# Patient Record
Sex: FEMALE | Race: WHITE | Employment: UNEMPLOYED | URBAN - METROPOLITAN AREA
[De-identification: names, ages, dates, MRNs, and addresses within clinical notes are randomized per-mention and may not be internally consistent; named-entity substitution may affect disease eponyms.]

---

## 2017-07-12 ENCOUNTER — TELEPHONE (OUTPATIENT)
Dept: INTERNAL MEDICINE CLINIC | Age: 57
End: 2017-07-12

## 2017-08-16 ENCOUNTER — TELEPHONE (OUTPATIENT)
Dept: INTERNAL MEDICINE CLINIC | Age: 57
End: 2017-08-16

## 2017-08-16 DIAGNOSIS — Z12.11 SCREEN FOR COLON CANCER: Primary | ICD-10-CM

## 2017-10-31 ENCOUNTER — OFFICE VISIT (OUTPATIENT)
Dept: INTERNAL MEDICINE CLINIC | Age: 57
End: 2017-10-31

## 2017-10-31 VITALS
BODY MASS INDEX: 23.11 KG/M2 | HEART RATE: 69 BPM | SYSTOLIC BLOOD PRESSURE: 110 MMHG | DIASTOLIC BLOOD PRESSURE: 72 MMHG | WEIGHT: 143.8 LBS | HEIGHT: 66 IN

## 2017-10-31 DIAGNOSIS — R20.2 PARESTHESIA OF RIGHT ARM: ICD-10-CM

## 2017-10-31 DIAGNOSIS — M54.2 CERVICALGIA: Primary | ICD-10-CM

## 2017-10-31 PROCEDURE — G8420 CALC BMI NORM PARAMETERS: HCPCS | Performed by: FAMILY MEDICINE

## 2017-10-31 PROCEDURE — 1036F TOBACCO NON-USER: CPT | Performed by: FAMILY MEDICINE

## 2017-10-31 PROCEDURE — 3017F COLORECTAL CA SCREEN DOC REV: CPT | Performed by: FAMILY MEDICINE

## 2017-10-31 PROCEDURE — G8427 DOCREV CUR MEDS BY ELIG CLIN: HCPCS | Performed by: FAMILY MEDICINE

## 2017-10-31 PROCEDURE — 3014F SCREEN MAMMO DOC REV: CPT | Performed by: FAMILY MEDICINE

## 2017-10-31 PROCEDURE — G8484 FLU IMMUNIZE NO ADMIN: HCPCS | Performed by: FAMILY MEDICINE

## 2017-10-31 PROCEDURE — 99213 OFFICE O/P EST LOW 20 MIN: CPT | Performed by: FAMILY MEDICINE

## 2017-10-31 RX ORDER — PREDNISONE 10 MG/1
TABLET ORAL
Qty: 39 TABLET | Refills: 0 | Status: SHIPPED | OUTPATIENT
Start: 2017-10-31 | End: 2017-11-10 | Stop reason: ALTCHOICE

## 2017-10-31 RX ORDER — CYCLOBENZAPRINE HCL 10 MG
10 TABLET ORAL 3 TIMES DAILY PRN
Qty: 30 TABLET | Refills: 0 | Status: SHIPPED | OUTPATIENT
Start: 2017-10-31 | End: 2017-11-10 | Stop reason: ALTCHOICE

## 2017-10-31 NOTE — PROGRESS NOTES
Subjective:      Patient ID: Freddy Mojica is a 62 y.o. female. HPI  Neck Pain  C/O \"nagging nasty pain\" at base of neck on right - got very severe several days ago, but has building up for the past year with driving 4-5 hours at a time, sometimes a few times a day. Radiates down arm  Has been driving a lot and working in the yard - \"tweaked it somehow\"    Using heating pad and pillow with transient relief. \"I need to be able to work and drive the car. \" Consultant, . 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 +N/T in right hand/fingers  \"Feels like I hit my funny bone. \"    No known injury      Review of Systems  Review of Systems - General ROS: negative  Psychological ROS: negative  ENT ROS: negative  Respiratory ROS: no cough, shortness of breath, or wheezing  Cardiovascular ROS: no chest pain or dyspnea on exertion  Gastrointestinal ROS: no abdominal pain, change in bowel habits, or black or bloody stools  Genito-Urinary ROS: no dysuria, trouble voiding, or hematuria  Musculoskeletal ROS: positive for - pain in right arm and right neck  negative for - gait disturbance or muscular weakness  Neurological ROS: positive for - numbness/tingling  negative for - dizziness, headaches, tremors or weakness  Dermatological ROS: negative      Objective:   Physical Exam  Nursing note and vitals reviewed. Vitals:    10/31/17 1407   BP: 110/72   Pulse: 69   Weight: 143 lb 12.8 oz (65.2 kg)   Height: 5' 6\" (1.676 m)     Wt Readings from Last 3 Encounters:   10/31/17 143 lb 12.8 oz (65.2 kg)   08/01/16 147 lb (66.7 kg)   07/23/16 140 lb (63.5 kg)     BP Readings from Last 3 Encounters:   10/31/17 110/72   08/01/16 100/62   07/23/16 119/70     Body mass index is 23.21 kg/m². Constitutional: Patient appears well-developed and well-nourished. No distress. Head: Normocephalic and atraumatic. Oropharyngeal exam: mucous membranes moist, pharynx normal without lesions. Neck: Normal range of motion. Neck supple.  No thyroidmegaly. Cardiovascular: Normal rate, regular rhythm, normal heart sounds and intact distal pulses. Pulmonary/Chest: Effort normal and breath sounds normal. No stridor. No respiratory distress. No wheezes and no rales. Abdominal: Soft. Bowel sounds are normal. No distension and no mass. No tenderness. No rebound and no guarding. Musculoskeletal: No edema and no tenderness. Skin: No rash or erythema. Psychiatric: Normal mood and affect. Behavior is normal.     Assessment:      1. Cervicalgia  predniSONE (DELTASONE) 10 MG tablet    cyclobenzaprine (FLEXERIL) 10 MG tablet    [facility]    Trish Sidhu MD (Spine- Non Surgical)   2. Paresthesia of right arm  predniSONE (DELTASONE) 10 MG tablet    cyclobenzaprine (FLEXERIL) 10 MG tablet    [facility]    Trish Sidhu MD (Spine- Non Surgical)           Plan:      Jen Johnson was seen today for neck pain. Diagnoses and all orders for this visit:    Cervicalgia / Paresthesia of right arm  -     predniSONE (DELTASONE) 10 MG tablet; Take 6 pills daily x2 days, then 4 pills daily x3 days, then 3 pills daily x3 days, then 2 pills daily x2 days, then 1 pill daily x2 days. -     cyclobenzaprine (FLEXERIL) 10 MG tablet; Take 1 tablet by mouth 3 times daily as needed for Muscle spasms  -     [facility]  -     Trish Sidhu MD (Spine- Non Surgical)  Prednisone taper and muscle relaxer prn as above. Pt to schedule with PT and with Dr. Sanjuanita Foote. Return if symptoms worsen or fail to improve. Needs to schedule appt to establish care with a new provider here soon (former Dr. Ce Ackerman pt).

## 2017-10-31 NOTE — PATIENT INSTRUCTIONS
your head toward your shoulder. For example, keeping your right shoulder down, lean your head to the left. 4. Repeat 2 to 4 times toward each shoulder. Diagonal neck stretch    1. Turn your head slightly toward the direction you will be stretching, and tilt your head diagonally toward your chest and hold for 15 to 30 seconds. 2. If you would like a little added stretch, use your hand to gently and steadily pull your head forward on the diagonal.  3. Repeat 2 to 4 times toward each side. Dorsal glide stretch    1. Sit or stand tall and look straight ahead. 2. Slowly tuck your chin as you glide your head backward over your body  3. Hold for a count of 6, and then relax for up to 10 seconds. 4. Repeat 8 to 12 times. Note: The dorsal glide stretches the back of the neck. If you feel pain, do not glide so far back. Some people find this exercise easier to do while lying on their backs with an ice pack on the neck. Chest and shoulder stretch    1. Sit or stand tall and glide your head backward as in the dorsal glide stretch. 2. Raise both arms so that your hands are next to your ears. 3. Take a deep breath, and as you breathe out, lower your elbows down and behind your back. You will feel your shoulder blades slide down and together, and at the same time you will feel a stretch across your chest and the front of your shoulders. 4. Hold for about 6 seconds, and then relax for up to 10 seconds. 5. Repeat 8 to 12 times. Strengthening: Hands on head    1. Move your head backward, forward, and side to side against gentle pressure from your hands, holding each position for about 6 seconds. 2. Repeat 8 to 12 times. Follow-up care is a key part of your treatment and safety. Be sure to make and go to all appointments, and call your doctor if you are having problems. It's also a good idea to know your test results and keep a list of the medicines you take. Where can you learn more?   Go to

## 2017-11-03 ENCOUNTER — TELEPHONE (OUTPATIENT)
Dept: INTERNAL MEDICINE CLINIC | Age: 57
End: 2017-11-03

## 2017-11-03 NOTE — TELEPHONE ENCOUNTER
Patient in on 10/31 and saw Dr. Rebeca Rodriguez for Cervicalgia . Dr. Rebeca Rodriguez, during ov, advised Barri that insurance company's want physical therapy done prior to an MRI. Juliaaustin Cortés wants to see Dr. Kassandra Ramirez and make her visit with him beneficial; therefore, she wants to go ahead and have an MRI ordered. Julia Cortés indicated that she will end up paying for the MRI through their HSA because of the high deductible, and understands that the insurance will question the MRI. Please call Julia Cortés to let her know how soon she can have this MRI @ [phone].

## 2017-11-10 ENCOUNTER — OFFICE VISIT (OUTPATIENT)
Dept: ORTHOPEDIC SURGERY | Age: 57
End: 2017-11-10

## 2017-11-10 VITALS
HEIGHT: 66 IN | DIASTOLIC BLOOD PRESSURE: 73 MMHG | WEIGHT: 143.74 LBS | HEART RATE: 63 BPM | SYSTOLIC BLOOD PRESSURE: 110 MMHG | BODY MASS INDEX: 23.1 KG/M2

## 2017-11-10 DIAGNOSIS — M47.22 OSTEOARTHRITIS OF SPINE WITH RADICULOPATHY, CERVICAL REGION: ICD-10-CM

## 2017-11-10 DIAGNOSIS — M50.30 DDD (DEGENERATIVE DISC DISEASE), CERVICAL: ICD-10-CM

## 2017-11-10 DIAGNOSIS — M54.12 CERVICAL RADICULOPATHY: Primary | ICD-10-CM

## 2017-11-10 PROCEDURE — 99244 OFF/OP CNSLTJ NEW/EST MOD 40: CPT | Performed by: PHYSICAL MEDICINE & REHABILITATION

## 2017-11-10 PROCEDURE — 3017F COLORECTAL CA SCREEN DOC REV: CPT | Performed by: PHYSICAL MEDICINE & REHABILITATION

## 2017-11-10 PROCEDURE — 3014F SCREEN MAMMO DOC REV: CPT | Performed by: PHYSICAL MEDICINE & REHABILITATION

## 2017-11-10 PROCEDURE — G8427 DOCREV CUR MEDS BY ELIG CLIN: HCPCS | Performed by: PHYSICAL MEDICINE & REHABILITATION

## 2017-11-10 PROCEDURE — G8484 FLU IMMUNIZE NO ADMIN: HCPCS | Performed by: PHYSICAL MEDICINE & REHABILITATION

## 2017-11-10 PROCEDURE — G8420 CALC BMI NORM PARAMETERS: HCPCS | Performed by: PHYSICAL MEDICINE & REHABILITATION

## 2017-11-10 RX ORDER — GABAPENTIN 300 MG/1
CAPSULE ORAL
Qty: 30 CAPSULE | Refills: 0 | Status: SHIPPED | OUTPATIENT
Start: 2017-11-10 | End: 2021-12-09 | Stop reason: ALTCHOICE

## 2017-11-10 RX ORDER — PREDNISONE 10 MG/1
TABLET ORAL
Qty: 26 TABLET | Refills: 0 | Status: SHIPPED | OUTPATIENT
Start: 2017-11-10 | End: 2017-11-29 | Stop reason: ALTCHOICE

## 2017-11-10 NOTE — PROGRESS NOTES
reports that she drinks alcohol. She reports that she does not use drugs. Family History:   Family History   Problem Relation Age of Onset    Cancer Mother        REVIEW OF SYSTEMS: Full ROS noted & scanned   CONSTITUTIONAL: Denies unexplained weight loss, fevers, chills or fatigue  NEUROLOGICAL: Denies unsteady gait or progressive weakness  MUSCULOSKELETAL: Denies joint swelling or redness  PSYCHOLOGICAL: Denies anxiety, depression   SKIN: Denies skin changes, delayed healing, rash, itching   HEMATOLOGIC: Denies easy bleeding or bruising  ENDOCRINE: Denies excessive thirst, urination, heat/cold  RESPIRATORY: Denies current dyspnea, cough  GI: Denies nausea, vomiting, diarrhea   : Denies bowel or bladder issues       PHYSICAL EXAM:    Vitals: Blood pressure 110/73, pulse 63, height 5' 5.98\" (1.676 m), weight 143 lb 11.8 oz (65.2 kg), not currently breastfeeding. GENERAL EXAM:  · General Apparence: Patient is adequately groomed with no evidence of malnutrition. · Orientation: The patient is oriented to time, place and person. · Mood & Affect:The patient's mood and affect are appropriate   · Vascular: Examination reveals no swelling tenderness in upper or lower extremities. Good capillary refill  · Lymphatic: The lymphatic examination bilaterally reveals all areas to be without enlargement or induration  · Sensation: Sensation is intact without deficit  · Coordination/Balance: Good coordination     CERVICAL EXAMINATION:  · Inspection: Local inspection shows no step-off or bruising. Cervical alignment is normal.     · Palpation: No evidence of tenderness at the midline, and trapezius. Paraspinal tenderness is present. There is no step-off or paraspinal spasm. · Range of Motion: Extension aggravates pain  · Strength: 5/5 bilateral upper extremities   · Special Tests:    ·   Spurling's, L'Hermitte's & Gibbs's negative bilaterally. ·   Kunz and Impingement tests are negative bilaterally.    ·  Cubital extremity does not show any tenderness, deformity or injury. Range of motion is full. There is no gross instability. There are no rashes, ulcerations or lesions. Strength and tone are normal.    Diagnostic Testing:      November 10, 2017 4 view cervical spine AP lateral and obliques show DDD C4 through C7, straightening of lordosis and C4 through C6 right foraminal narrowing    Impression:    2 weeks right clinical C7 radiculitis with hand and triceps weakness        Plan:     MRI cervical spine  Pred taper  Patient will pursue acupuncture with Dr. Td Chinchilla M.D.   Physical therapy  Follow up to 3 weeks after MRI   Gabapentin 300 mg q.h.s.   Dhruv Castillo

## 2017-11-13 ENCOUNTER — HOSPITAL ENCOUNTER (OUTPATIENT)
Dept: MRI IMAGING | Age: 57
Discharge: HOME | End: 2017-11-13
Attending: PHYSICAL MEDICINE & REHABILITATION | Admitting: PHYSICAL MEDICINE & REHABILITATION

## 2017-11-13 DIAGNOSIS — M47.22 OSTEOARTHRITIS OF SPINE WITH RADICULOPATHY, CERVICAL REGION: ICD-10-CM

## 2017-11-13 DIAGNOSIS — R07.2 PRECORDIAL PAIN: ICD-10-CM

## 2017-11-13 DIAGNOSIS — M54.12 CERVICAL RADICULOPATHY: ICD-10-CM

## 2017-11-13 DIAGNOSIS — M50.30 DDD (DEGENERATIVE DISC DISEASE), CERVICAL: ICD-10-CM

## 2017-11-29 ENCOUNTER — OFFICE VISIT (OUTPATIENT)
Dept: ORTHOPEDIC SURGERY | Age: 57
End: 2017-11-29

## 2017-11-29 VITALS
HEIGHT: 66 IN | SYSTOLIC BLOOD PRESSURE: 114 MMHG | HEART RATE: 55 BPM | DIASTOLIC BLOOD PRESSURE: 76 MMHG | BODY MASS INDEX: 23.1 KG/M2 | WEIGHT: 143.74 LBS

## 2017-11-29 DIAGNOSIS — M47.22 OSTEOARTHRITIS OF SPINE WITH RADICULOPATHY, CERVICAL REGION: ICD-10-CM

## 2017-11-29 DIAGNOSIS — M54.12 CERVICAL RADICULOPATHY: Primary | ICD-10-CM

## 2017-11-29 DIAGNOSIS — M50.30 DDD (DEGENERATIVE DISC DISEASE), CERVICAL: ICD-10-CM

## 2017-11-29 PROCEDURE — G8427 DOCREV CUR MEDS BY ELIG CLIN: HCPCS | Performed by: PHYSICAL MEDICINE & REHABILITATION

## 2017-11-29 PROCEDURE — 99213 OFFICE O/P EST LOW 20 MIN: CPT | Performed by: PHYSICAL MEDICINE & REHABILITATION

## 2017-11-29 PROCEDURE — 1036F TOBACCO NON-USER: CPT | Performed by: PHYSICAL MEDICINE & REHABILITATION

## 2017-11-29 PROCEDURE — 3017F COLORECTAL CA SCREEN DOC REV: CPT | Performed by: PHYSICAL MEDICINE & REHABILITATION

## 2017-11-29 PROCEDURE — 3014F SCREEN MAMMO DOC REV: CPT | Performed by: PHYSICAL MEDICINE & REHABILITATION

## 2017-11-29 PROCEDURE — G8420 CALC BMI NORM PARAMETERS: HCPCS | Performed by: PHYSICAL MEDICINE & REHABILITATION

## 2017-11-29 PROCEDURE — G8484 FLU IMMUNIZE NO ADMIN: HCPCS | Performed by: PHYSICAL MEDICINE & REHABILITATION

## 2017-12-01 ENCOUNTER — HOSPITAL ENCOUNTER (OUTPATIENT)
Dept: PHYSICAL THERAPY | Age: 57
Discharge: HOME | End: 2017-12-31
Attending: PHYSICAL MEDICINE & REHABILITATION | Admitting: PHYSICAL MEDICINE & REHABILITATION

## 2017-12-04 ENCOUNTER — HOSPITAL ENCOUNTER (OUTPATIENT)
Dept: PHYSICAL THERAPY | Age: 57
Discharge: HOME | End: 2017-11-30
Admitting: PHYSICAL MEDICINE & REHABILITATION

## 2017-12-04 ENCOUNTER — HOSPITAL ENCOUNTER (OUTPATIENT)
Dept: PHYSICAL THERAPY | Age: 57
Discharge: HOME | End: 2017-12-04
Admitting: PHYSICAL MEDICINE & REHABILITATION

## 2017-12-04 NOTE — FLOWSHEET NOTE
[facility],  [address]  Phone: [phone]  Fax [phone]    Physical Therapy Daily Treatment Note  Date:  2017    Patient Name:  Josy Winters    :  1960  MRN: [id number]  Restrictions/Precautions:    Physician Information:  Referring Practitioner: Dr. Enoch Metcalf   Medical/Treatment Diagnosis Information:  · Diagnosis: M54.12 cervical radiculopathy, M50.30 degenerative disc disease cervical, M47.22 Osteoarthritis of spine with radiculopathy   · Treatment Diagnosis:   M54.2 neck pain, M62.9 muscular tightness                                          [x] Conservative / [] Surgical - DOS:  Therapy Diagnosis/Practice Pattern:  Practice Pattern F: Spinal Disorders  Insurance/Certification information:  PT Insurance Information: [address] [place] [address] of care signed: [] YES  [] NO  Number of Comorbidities:  []0     [x]1-2    []3+  Date of Patient follow up with Physician:     G-Code (if applicable):      Date G-Code Applied: 17   PT G-Codes  Functional Assessment Tool Used: Neck Disability Index  Score: 38%  Functional Limitation: Carrying, moving and handling objects  Carrying, Moving and Handling Objects Current Status (): At least 20 percent but less than 40 percent impaired, limited or restricted  Carrying, Moving and Handling Objects Goal Status ():  At least 1 percent but less than 20 percent impaired, limited or restricted    Progress Note: [x]  Yes  []  No  Next due by: Visit #10        Latex Allergy:  [x]NO      []YES  Preferred Language for Healthcare:   [x]English       []other:    Visit # Insurance Allowable Reporting Period    does not require auth Begin Date: 2017               End Date:      RECERT DUE BY: 79    SUBJECTIVE:  See eval    OBJECTIVE: See eval  Observation:  Palpation:     Test used Initial score Current Score   Pain Summary VAS     Functional questionnaire Lore and UE control with self care, carrying, lifting, driving/computer work. Home Exercise Program:    [x] (32663) Reviewed/Progressed HEP activities related to strengthening, flexibility, endurance, ROM of cervical, scapular, scapulothoracic and UE control with self care, reaching, carrying, lifting, house/yardwork, driving/computer work  [] (91374) Reviewed/Progressed HEP activities related to improving balance, coordination, kinesthetic sense, posture, motor skill, proprioception of cervical, scapular, scapulothoracic and UE control with self care, reaching, carrying, lifting, house/yardwork, driving/computer work      Manual Treatments:  PROM / STM / Oscillations-Mobs:  G-I, II, III, IV (PA's, Inf., Post.)  [] (25176) Provided manual therapy to mobilize soft tissue/joints of cervical/CT, scapular GHJ and UE for the purpose of decreasing headache, modulating pain, promoting relaxation,  increasing ROM, reducing/eliminating soft tissue swelling/inflammation/restriction, improving soft tissue extensibility and allowing for proper ROM for normal function with self care, reaching, carrying, lifting, house/yardwork, driving/computer work    Modalities:  PM/MHP x 10 min supine     Charges:  Timed Code Treatment Minutes: 30   Total Treatment Minutes: 65     [x] EVAL (LOW) 29676 (typically 20 minutes face-to-face)  [] EVAL (MOD) 43918 (typically 30 minutes face-to-face)  [] EVAL (HIGH) 11622 (typically 45 minutes face-to-face)  [] RE-EVAL     [] JL(32953) x      [] IONTO  [] NMR (96593) x      [] VASO  [x] Manual (59473) x  2    [] Other:  [] TA x       [] Mech Traction (39999)  [] ES(attended) (64577)      [x] ES (un) (85178):     GOALS:  Patient stated goal: Pt would like to get back to playing tennis and crocheting with decreased pain and limitations. Therapist goals for Patient:   Short Term Goals: To be achieved in: 2 weeks  1.  Independent in HEP and progression per patient tolerance, in order to prevent re-injury. 2. Patient will have a decrease in pain to facilitate improvement in movement, function, and ADLs as indicated by Functional Deficits. Long Term Goals: To be achieved in: 10 weeks  1. Disability index score of 20% or less for the NDI to assist with reaching prior level of function. 2. Patient will demonstrate increased AROM to [facility] of cervical/thoracic spine to allow for proper joint functioning as indicated by patients Functional Deficits. 3. Patient will demonstrate an increase in postural awareness and control and activation of  Deep cervical stabilizers to allow for proper functional mobility as indicated by patients Functional Deficits. 4. Patient will return to sleeping, gripping objects functional activities without increased symptoms or restriction. 5. Patient will return to playing tennis and crocheting with decreased pain and limitations from her neck and R UE. New or Updated Goals (if applicable):  [x] No change to goals established upon initial eval/last progress note:  New Goals:    Progression Towards Functional goals:   [] Patient is progressing as expected towards functional goals listed. [] Progression is slowed due to complexities listed. [] Progression has been slowed due to co-morbidities.   [x] Plan just implemented, too soon to assess goals progression  [] Other:     ASSESSMENT:    [] Improvement noted relative to goals:  [] No Improvement noted related to goals:  Summary/Patient's response to treatment: See Eval    Treatment/Activity Tolerance:  [x] Patient tolerated treatment well [] Patient limited by fatique  [] Patient limited by pain  [] Patient limited by other medical complications  [] Other:     Prognosis: [] Good [] Fair  [] Poor    Patient Requires Follow-up: [x] Yes  [] No    PLAN: See eval  [] Continue per plan of care [] Alter current plan (see comments)  [x] Plan of care initiated [] Hold pending MD visit [] Discharge    Electronically signed by:

## 2017-12-04 NOTE — PLAN OF CARE
[facility], [address] [facility]  [address] [place] [address], [address] [place] [address]  Phone: [phone]  Fax [phone]   Physical Therapy Certification    Dear Referring Practitioner: Dr. Sanjuanita Foote ,    We had the pleasure of evaluating the following patient for physical therapy services at [address] [place] [address]. A summary of our findings can be found in the initial assessment below. This includes our plan of care. If you have any questions or concerns regarding these findings, please do not hesitate to contact me at the office phone number checked above. Thank you for the referral.       Physician Signature:_______________________________Date:__________________  By signing above (or electronic signature), therapists plan is approved by physician      Patient: Lita Huff   : 1960   MRN: [id number]  Referring Physician: Referring Practitioner: Dr. Sanjuanita Foote       Evaluation Date: 2017      Medical Diagnosis Information:  Diagnosis: M54.12 cervical radiculopathy, M50.30 degenerative disc disease cervical, M47.22 Osteoarthritis of spine with radiculopathy    Treatment Diagnosis: M54.2 neck pain, M62.9 muscular tightness                                          Insurance information: PT Insurance Information: [facility]    Precautions/ Contra-indications: C5-C6 disc bulge, C6-C7 framinal stenosis   Latex Allergy:  [x]NO      []YES  Preferred Language for Healthcare:   [x]English       []other:    SUBJECTIVE: Patient stated complaint: neck pain and radiating right arm pain/symptoms. 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 Reports no injury. Pt reports that she went to PCP and she was referred to Dr. Sanjuanita Foote. Pt had MRI taken with findings of C6-C7 disc bulge and C6-C7 foraminal stenosis.  Pt reports that she has numbness pain in R arm and R arm tolerance with driving and/or computer work   [x]Reduced ability to perform lifting, reaching, carrying tasks   [x]Reduced ability to concentrate   [x]Reduced ability to sleep    [x]Reduced ability to tolerate any impact through UE or spine   []Reduced ability to ambulate prolonged functional periods/distances   []other:    Participation Restrictions   [x]Reduced participation in self care activities   [x]Reduced participation in home management activities   [x]Reduced participation in work activities   [x]Reduced participation in social activities. [x]Reduced participation in sport/recreational activities. Classification/Subgrouping:   []signs/symptoms consistent with neck pain with mobility deficits     []signs/symptoms consistent with neck pain with movement coordinated impairments    []signs/symptoms consistent with neck pain with radiating pain    []signs/symptoms consistent with neck pain with headaches (cervicogenic)    [x]Signs/symptoms consistent with nerve root involvement including myotome & dermatome dysfunction   []sign/symptoms consistent with facet dysfunction of cervical and thoracic spine    []signs/symptoms consistent suggesting central cord compression/UMN syndromes   []signs/symptoms consistent with discogenic cervical pain   []signs/symptoms consistent with rib dysfunction   []signs/symptoms consistent with postural dysfunction   []signs/symptoms consistent with shoulder pathology    []signs/symptoms consistent with post-surgical status including decreased ROM, strength and function.    []signs/symptoms consistent with pathology which may benefit from Dry Needling   []signs/symptoms which may limit the use of advanced manual therapy techniques: (Elevated CV risk profile, recent trauma, intolerance to end range positions, prior TIA, visual issues, UE neurological compromise )     Prognosis/Rehab Potential:      []Excellent   [x]Good    []Fair   []Poor    Tolerance of evaluation/treatment: modification, progression of HEP. HEP instruction: Pt given and educated on HEP. (see scanned forms)    GOALS:  Patient stated goal: Pt would like to get back to playing tennis and crocheting with decreased pain and limitations. Therapist goals for Patient:   Short Term Goals: To be achieved in: 2 weeks  1. Independent in HEP and progression per patient tolerance, in order to prevent re-injury. 2. Patient will have a decrease in pain to facilitate improvement in movement, function, and ADLs as indicated by Functional Deficits. Long Term Goals: To be achieved in: 10 weeks  1. Disability index score of 20% or less for the NDI to assist with reaching prior level of function. 2. Patient will demonstrate increased AROM to [facility] of cervical/thoracic spine to allow for proper joint functioning as indicated by patients Functional Deficits. 3. Patient will demonstrate an increase in postural awareness and control and activation of  Deep cervical stabilizers to allow for proper functional mobility as indicated by patients Functional Deficits. 4. Patient will return to sleeping, gripping objects functional activities without increased symptoms or restriction. 5. Patient will return to playing tennis and crocheting with decreased pain and limitations from her neck and R UE.       Electronically signed by:  Barb Snider, PT

## 2017-12-07 ENCOUNTER — HOSPITAL ENCOUNTER (OUTPATIENT)
Dept: PHYSICAL THERAPY | Age: 57
Discharge: HOME | End: 2017-12-07
Admitting: PHYSICAL MEDICINE & REHABILITATION

## 2017-12-07 NOTE — FLOWSHEET NOTE
[facility], [address]  Phone: [phone]  Fax [phone]    Physical Therapy Daily Treatment Note  Date:  2017    Patient Name:  Sanjeev Chew    :  1960  MRN: [id number]  Restrictions/Precautions:    Physician Information:    Referring Practitioner: Dr. Toni Orozco   Medical/Treatment Diagnosis Information:     M54.12 cervical radiculopathy, M50.30 degenerative disc disease cervical  · Treatment Diagnosis:   M54.2 neck pain, M62.9 muscular tightness                                          [x] Conservative / [] Surgical - DOS:  Therapy Diagnosis/Practice Pattern:  Practice Pattern F: Spinal Disorders  Insurance/Certification information:    PT Insurance Information: [facility]  Plan of care signed: [x] YES  [] NO  Number of Comorbidities:  []0     [x]1-2    []3+  Date of Patient follow up with Physician:     G-Code (if applicable):      Date G-Code Applied: 17        Progress Note: []  Yes  [x]  No  Next due by: Visit #10        Latex Allergy:  [x]NO      []YES  Preferred Language for Healthcare:   [x]English       []other:    Visit # Insurance Allowable Reporting Period    does not require auth Begin Date: 2017               End Date:      RECERT DUE BY: 6/3/93    SUBJECTIVE:  Pt reports that she is feeling \"a little better. \" Reports decreased numbness/tingling in R hand.      OBJECTIVE:   Observation:  Palpation: (+) TTP R UT      Test used Initial score Current Score   Pain Summary VAS 10    Functional questionnaire NDI 38%    ROM Cervical Flexion      Cervical Ext      Cervical SB R/L      Cervical rotation R/L     Strength                     RESTRICTIONS/PRECAUTIONS:  C5-C6 disc bulge, C6-C7 framinal stenosis     Exercises/Interventions:   Therapeutic Ex Sets/sec Reps Notes   UBE      T- band Row/pinch      T- band lower pinch      T- band ER activation      UT stretch  4    Levator stretch      Isometric at wall      Quadruped w cerv retract      Front plank      Side plank      Chin tuck 2 10 Seated and Supine one pillow    Chin tuck w lift      Chin tuck w rotation      SBS  3 10    SB SBS 2 10    Manual Intervention      Cervical distraction  5 min   Decreased pain   Trigger point release/STM UT  15 min      Cervical Retraction  2x10      PA mobs cervical grade I-II 5 min   Pain relief                     NMR re-education      T-spine Ext- foam roll      Chin tucks  3 10 With OP/AROM supine    Pt education                   Traction                  Centralization of symptoms post MT. Therapeutic Exercise and NMR EXR  [x] (09781) Provided verbal/tactile cueing for activities related to strengthening, flexibility, endurance, ROM  for improvements in cervical, postural, scapular, scapulothoracic and UE control with self care, reaching, carrying, lifting, house/yardwork, driving/computer work.    [] (08780) Provided verbal/tactile cueing for activities related to improving balance, coordination, kinesthetic sense, posture, motor skill, proprioception  to assist with cervical, scapular, scapulothoracic and UE control with self care, reaching, carrying, lifting, house/yardwork, driving/computer work. Therapeutic Activities:    [] (09471 or 32986) Provided verbal/tactile cueing for activities related to improving balance, coordination, kinesthetic sense, posture, motor skill, proprioception and motor activation to allow for proper function of cervical, scapular, scapulothoracic and UE control with self care, carrying, lifting, driving/computer work.      Home Exercise Program:    [x] (20658) Reviewed/Progressed HEP activities related to strengthening, flexibility, endurance, ROM of cervical, scapular, scapulothoracic and UE control with self care, reaching, carrying, lifting, house/yardwork, driving/computer work  [] (25076) Reviewed/Progressed HEP activities related to improving balance, coordination, kinesthetic sense, posture, motor skill, proprioception of cervical, scapular, scapulothoracic and UE control with self care, reaching, carrying, lifting, house/yardwork, driving/computer work      Manual Treatments:  PROM / STM / Oscillations-Mobs:  G-I, II, III, IV (PA's, Inf., Post.)  [] (75142) Provided manual therapy to mobilize soft tissue/joints of cervical/CT, scapular GHJ and UE for the purpose of decreasing headache, modulating pain, promoting relaxation,  increasing ROM, reducing/eliminating soft tissue swelling/inflammation/restriction, improving soft tissue extensibility and allowing for proper ROM for normal function with self care, reaching, carrying, lifting, house/yardwork, driving/computer work    Modalities:  PM/MHP x 15 min supine     Charges:  Timed Code Treatment Minutes: 45   Total Treatment Minutes: 60     [] EVAL (LOW) 88159 (typically 20 minutes face-to-face)  [] EVAL (MOD) 69070 (typically 30 minutes face-to-face)  [] EVAL (HIGH) 99928 (typically 45 minutes face-to-face)  [] RE-EVAL     [x] IX(55815) x  1   [] IONTO  [] NMR (43757) x      [] VASO  [x] Manual (29031) x  2    [] Other:  [] TA x       [] Mech Traction (02206)  [] ES(attended) (59677)      [x] ES (un) (97754):     GOALS:  Patient stated goal: Pt would like to get back to playing tennis and crocheting with decreased pain and limitations. Therapist goals for Patient:   Short Term Goals: To be achieved in: 2 weeks  1. Independent in HEP and progression per patient tolerance, in order to prevent re-injury. 2. Patient will have a decrease in pain to facilitate improvement in movement, function, and ADLs as indicated by Functional Deficits. Long Term Goals: To be achieved in: 10 weeks  1. Disability index score of 20% or less for the NDI to assist with reaching prior level of function.    2. Patient will demonstrate increased AROM to [facility] of cervical/thoracic spine to allow for proper joint functioning as indicated by patients Functional Deficits. 3. Patient will demonstrate an increase in postural awareness and control and activation of  Deep cervical stabilizers to allow for proper functional mobility as indicated by patients Functional Deficits. 4. Patient will return to sleeping, gripping objects functional activities without increased symptoms or restriction. 5. Patient will return to playing tennis and crocheting with decreased pain and limitations from her neck and R UE. New or Updated Goals (if applicable):  [x] No change to goals established upon initial eval/last progress note:  New Goals:    Progression Towards Functional goals:   [] Patient is progressing as expected towards functional goals listed. [] Progression is slowed due to complexities listed. [] Progression has been slowed due to co-morbidities. [x] Plan just implemented, too soon to assess goals progression  [] Other:     ASSESSMENT:    [] Improvement noted relative to goals:  [] No Improvement noted related to goals:  Summary/Patient's response to treatment: Pt tolerated treatment session well with reported centralization of symptoms post manual therapy. Pt displayed decreased tenderness/tightness in UT post MT and e-stim+MHP. Pt tolerated some light parascapular and postural strengthening. She is not at her PLOF and will continue to require skilled PT to address her deficits and get her back to her PLOF.      Treatment/Activity Tolerance:  [x] Patient tolerated treatment well [] Patient limited by fatique  [] Patient limited by pain  [] Patient limited by other medical complications  [] Other:     Prognosis: [x] Good [] Fair  [] Poor    Patient Requires Follow-up: [x] Yes  [] No    PLAN: See eval  [x] Continue per plan of care [] Alter current plan (see comments)  [] Plan of care initiated [] Hold pending MD visit [] Discharge    Electronically signed by: Blaine Romero, PT

## 2017-12-12 ENCOUNTER — TELEPHONE (OUTPATIENT)
Dept: INTERNAL MEDICINE CLINIC | Age: 57
End: 2017-12-12

## 2017-12-12 ENCOUNTER — HOSPITAL ENCOUNTER (OUTPATIENT)
Dept: PHYSICAL THERAPY | Age: 57
Discharge: HOME | End: 2017-12-12
Admitting: PHYSICAL MEDICINE & REHABILITATION

## 2017-12-12 NOTE — FLOWSHEET NOTE
[facility], [address] [place]  Phone: [phone]  Fax [phone]    Physical Therapy Daily Treatment Note  Date:  2017    Patient Name:  Salome Bowie    :  1960  MRN: [id number]  Restrictions/Precautions:    Physician Information:    Referring Practitioner: Dr. Jovanni Dukes   Medical/Treatment Diagnosis Information:     M54.12 cervical radiculopathy, M50.30 degenerative disc disease cervical  · Treatment Diagnosis:   M54.2 neck pain, M62.9 muscular tightness                                          [x] Conservative / [] Surgical - DOS:  Therapy Diagnosis/Practice Pattern:  Practice Pattern F: Spinal Disorders  Insurance/Certification information:    PT Insurance Information: [facility]  Plan of care signed: [x] YES  [] NO  Number of Comorbidities:  []0     [x]1-2    []3+  Date of Patient follow up with Physician:     G-Code (if applicable):      Date G-Code Applied: 17        Progress Note: []  Yes  [x]  No  Next due by: Visit #10        Latex Allergy:  [x]NO      []YES  Preferred Language for Healthcare:   [x]English       []other:    Visit # Insurance Allowable Reporting Period   3  does not require auth Begin Date: 2017               End Date:      RECERT DUE BY: 3/7/82    SUBJECTIVE:  PT is helping. Less pain overall. Residual numbness digits 4 and 5.     OBJECTIVE:   Observation:  Palpation: (+) TTP R UT      Test used Initial score Current Score   Pain Summary VAS 10    Functional questionnaire NDI 38%    ROM Cervical Flexion      Cervical Ext      Cervical SB R/L      Cervical rotation R/L     Strength                     RESTRICTIONS/PRECAUTIONS:  C5-C6 disc bulge, C6-C7 framinal stenosis     Exercises/Interventions:   Therapeutic Ex Sets/sec Reps Notes   UBE      T- band Row/pinch      T- band lower pinch      T- band ER activation      UT stretch  4    Levator stretch      No money 10 2 GTB Quadruped w cerv retract      Front plank      Side plank      Chin tuck 2 10 Seated and Supine one pillow    Chin tuck w lift      Chin tuck w rotation      SBS  3 10    SB SBS 2 10    Manual Intervention      Cervical distraction  5 min   Decreased pain   Trigger point release/STM UT  15 min      Cervical Retraction  2x10      PA mobs cervical grade I-II 5 min   Pain relief   1st rib mob 2 min                 NMR re-education      T-spine Ext- foam roll      Chin tucks  3 10 With OP/AROM supine    Pt education                   Traction                  Centralization of symptoms post MT. Therapeutic Exercise and NMR EXR  [x] (86160) Provided verbal/tactile cueing for activities related to strengthening, flexibility, endurance, ROM  for improvements in cervical, postural, scapular, scapulothoracic and UE control with self care, reaching, carrying, lifting, house/yardwork, driving/computer work.    [] (62543) Provided verbal/tactile cueing for activities related to improving balance, coordination, kinesthetic sense, posture, motor skill, proprioception  to assist with cervical, scapular, scapulothoracic and UE control with self care, reaching, carrying, lifting, house/yardwork, driving/computer work. Therapeutic Activities:    [] (50791 or 00455) Provided verbal/tactile cueing for activities related to improving balance, coordination, kinesthetic sense, posture, motor skill, proprioception and motor activation to allow for proper function of cervical, scapular, scapulothoracic and UE control with self care, carrying, lifting, driving/computer work.      Home Exercise Program:    [x] (77948) Reviewed/Progressed HEP activities related to strengthening, flexibility, endurance, ROM of cervical, scapular, scapulothoracic and UE control with self care, reaching, carrying, lifting, house/yardwork, driving/computer work  [] (46865) Reviewed/Progressed HEP activities related to improving balance, coordination,

## 2017-12-14 ENCOUNTER — OFFICE VISIT (OUTPATIENT)
Dept: ORTHOPEDIC SURGERY | Age: 57
End: 2017-12-14

## 2017-12-14 VITALS — WEIGHT: 143.74 LBS | HEIGHT: 66 IN | BODY MASS INDEX: 23.1 KG/M2

## 2017-12-14 DIAGNOSIS — M50.00 HNP (HERNIATED NUCLEUS PULPOSUS) WITH MYELOPATHY, CERVICAL: Primary | ICD-10-CM

## 2017-12-15 ENCOUNTER — HOSPITAL ENCOUNTER (OUTPATIENT)
Dept: PHYSICAL THERAPY | Age: 57
Discharge: HOME | End: 2017-12-15
Admitting: PHYSICAL MEDICINE & REHABILITATION

## 2017-12-15 NOTE — FLOWSHEET NOTE
Exercises/Interventions:   Therapeutic Ex Sets/sec Reps Notes   UBE      T- band Row/pinch 1 10 YTB   T- band lower pinch 1 10 YTB   T- band ER activation      UT stretch 1/30 2    Levator stretch 1/30 2    No money 10 2 GTB   Quadruped w cerv retract      Front plank      Side plank      Chin tuck 2 10 Seated and     Chin tuck w lift      Chin tuck w rotation      SBS  3 10    SB SBS 2 10    Manual Intervention      Cervical distraction  5 min   Decreased pain   Trigger point release/STM UT  15 min      Cervical Retraction  2x10      PA mobs cervical grade I-II 5 min   Pain relief   1st rib mob 2 min                 NMR re-education      T-spine Ext- foam roll      Chin tucks  3 10 With OP/AROM supine    Pt education                   Traction                  Centralization of symptoms post MT. Therapeutic Exercise and NMR EXR  [x] (27761) Provided verbal/tactile cueing for activities related to strengthening, flexibility, endurance, ROM  for improvements in cervical, postural, scapular, scapulothoracic and UE control with self care, reaching, carrying, lifting, house/yardwork, driving/computer work.    [] (99884) Provided verbal/tactile cueing for activities related to improving balance, coordination, kinesthetic sense, posture, motor skill, proprioception  to assist with cervical, scapular, scapulothoracic and UE control with self care, reaching, carrying, lifting, house/yardwork, driving/computer work. Therapeutic Activities:    [] (26902 or 72935) Provided verbal/tactile cueing for activities related to improving balance, coordination, kinesthetic sense, posture, motor skill, proprioception and motor activation to allow for proper function of cervical, scapular, scapulothoracic and UE control with self care, carrying, lifting, driving/computer work.      Home Exercise Program:    [x] (16688) Reviewed/Progressed HEP activities related to strengthening, flexibility, endurance, ROM of cervical, of 20% or less for the NDI to assist with reaching prior level of function. 2. Patient will demonstrate increased AROM to [facility] of cervical/thoracic spine to allow for proper joint functioning as indicated by patients Functional Deficits. 3. Patient will demonstrate an increase in postural awareness and control and activation of  Deep cervical stabilizers to allow for proper functional mobility as indicated by patients Functional Deficits. 4. Patient will return to sleeping, gripping objects functional activities without increased symptoms or restriction. 5. Patient will return to playing tennis and crocheting with decreased pain and limitations from her neck and R UE. New or Updated Goals (if applicable):  [x] No change to goals established upon initial eval/last progress note:  New Goals:    Progression Towards Functional goals:   [x] Patient is progressing as expected towards functional goals listed. [] Progression is slowed due to complexities listed. [] Progression has been slowed due to co-morbidities. [] Plan just implemented, too soon to assess goals progression  [] Other:     ASSESSMENT:    [] Improvement noted relative to goals:  [] No Improvement noted related to goals:  Summary/Patient's response to treatment: No complaints of distal extremity pain during session. Tightness persists R UT and scalenes. Pt tolerated manuals well. Continue with current treatment plan.     Treatment/Activity Tolerance:  [x] Patient tolerated treatment well [] Patient limited by fatique  [] Patient limited by pain  [] Patient limited by other medical complications  [] Other:     Prognosis: [x] Good [] Fair  [] Poor    Patient Requires Follow-up: [x] Yes  [] No    PLAN: See eval  [x] Continue per plan of care [] Alter current plan (see comments)  [] Plan of care initiated [] Hold pending MD visit [] Discharge    Electronically signed by: Jagjit Manzo, PT

## 2017-12-18 ENCOUNTER — HOSPITAL ENCOUNTER (OUTPATIENT)
Dept: PHYSICAL THERAPY | Age: 57
Discharge: HOME | End: 2017-12-18
Admitting: PHYSICAL MEDICINE & REHABILITATION

## 2017-12-18 NOTE — FLOWSHEET NOTE
stretch 1/30 2    Levator stretch 1/30 2    Pec stretch at door  1/30  3    No money 10 2 GTB   Quadruped w cerv retract      Front plank      Side plank      Chin tuck 2 10 Seated and     Chin tuck w lift 2 10    Chin tuck w rotation      SBS  3 10    SB SBS 2 10    Prone scapular retraction  2 10    Ulnar nerve glide 3 10           Manual Intervention      Cervical distraction  5 min   Decreased pain   Trigger point release/STM UT  10 min      Cervical Retraction  2x10      PA mobs cervical grade I-II 5 min   Pain relief   1st rib mob 2 min                 NMR re-education      T-spine Ext- foam roll      Chin tucks  3 10 With OP/AROM supine    Pt education       Edge of plnth plank+chin tuck  1 4 20\"          Traction                  Centralization of symptoms post MT. Therapeutic Exercise and NMR EXR  [x] (09101) Provided verbal/tactile cueing for activities related to strengthening, flexibility, endurance, ROM  for improvements in cervical, postural, scapular, scapulothoracic and UE control with self care, reaching, carrying, lifting, house/yardwork, driving/computer work.    [] (90295) Provided verbal/tactile cueing for activities related to improving balance, coordination, kinesthetic sense, posture, motor skill, proprioception  to assist with cervical, scapular, scapulothoracic and UE control with self care, reaching, carrying, lifting, house/yardwork, driving/computer work. Therapeutic Activities:    [] (06351 or 89415) Provided verbal/tactile cueing for activities related to improving balance, coordination, kinesthetic sense, posture, motor skill, proprioception and motor activation to allow for proper function of cervical, scapular, scapulothoracic and UE control with self care, carrying, lifting, driving/computer work.      Home Exercise Program:    [x] (31533) Reviewed/Progressed HEP activities related to strengthening, flexibility, endurance, ROM of cervical, scapular, scapulothoracic and UE control with self care, reaching, carrying, lifting, house/yardwork, driving/computer work  [] (97455) Reviewed/Progressed HEP activities related to improving balance, coordination, kinesthetic sense, posture, motor skill, proprioception of cervical, scapular, scapulothoracic and UE control with self care, reaching, carrying, lifting, house/yardwork, driving/computer work      Manual Treatments:  PROM / STM / Oscillations-Mobs:  G-I, II, III, IV (PA's, Inf., Post.)  [x] (38520) Provided manual therapy to mobilize soft tissue/joints of cervical/CT, scapular GHJ and UE for the purpose of decreasing headache, modulating pain, promoting relaxation,  increasing ROM, reducing/eliminating soft tissue swelling/inflammation/restriction, improving soft tissue extensibility and allowing for proper ROM for normal function with self care, reaching, carrying, lifting, house/yardwork, driving/computer work    Modalities:  PM/MHP x 15 min seated     Charges:  Timed Code Treatment Minutes: 45   Total Treatment Minutes: 60     [] EVAL (LOW) 40313 (typically 20 minutes face-to-face)  [] EVAL (MOD) 35866 (typically 30 minutes face-to-face)  [] EVAL (HIGH) 30722 (typically 45 minutes face-to-face)  [] RE-EVAL     [x] MP(98760) x  1   [] IONTO  [] NMR (90029) x      [] VASO  [x] Manual (46114) x  2    [] Other:  [] TA x       [] Mech Traction (86439)  [] ES(attended) (34642)      [x] ES (un) (73158):     GOALS:  Patient stated goal: Pt would like to get back to playing tennis and crocheting with decreased pain and limitations. Therapist goals for Patient:   Short Term Goals: To be achieved in: 2 weeks  1. Independent in HEP and progression per patient tolerance, in order to prevent re-injury. 2. Patient will have a decrease in pain to facilitate improvement in movement, function, and ADLs as indicated by Functional Deficits. Long Term Goals: To be achieved in: 10 weeks  1.  Disability index score of 20% or less for the NDI to

## 2017-12-21 ENCOUNTER — HOSPITAL ENCOUNTER (OUTPATIENT)
Dept: PHYSICAL THERAPY | Age: 57
Discharge: HOME | End: 2017-12-21
Admitting: PHYSICAL MEDICINE & REHABILITATION

## 2017-12-21 NOTE — FLOWSHEET NOTE
[facility],  [address]  Phone: [phone]  Fax [phone]    Physical Therapy Daily Treatment Note  Date:  2017    Patient Name:  Lita Huff    :  1960  MRN: [id number]  Restrictions/Precautions:    Physician Information:    Referring Practitioner: Dr. Sanjuanita Foote   Medical/Treatment Diagnosis Information:     M54.12 cervical radiculopathy, M50.30 degenerative disc disease cervical  · Treatment Diagnosis:   M54.2 neck pain, M62.9 muscular tightness                                          [x] Conservative / [] Surgical - DOS:  Therapy Diagnosis/Practice Pattern:  Practice Pattern F: Spinal Disorders  Insurance/Certification information:    PT Insurance Information: [facility]  Plan of care signed: [x] YES  [] NO  Number of Comorbidities:  []0     [x]1-2    []3+  Date of Patient follow up with Physician:     G-Code (if applicable):      Date G-Code Applied: 17        Progress Note: []  Yes  [x]  No  Next due by: Visit #10        Latex Allergy:  [x]NO      []YES  Preferred Language for Healthcare:   [x]English       []other:    Visit # Insurance Allowable Reporting Period    does not require auth Begin Date: 2017               End Date:      RECERT DUE BY: 3/8/80    SUBJECTIVE:  Pt reports some decreased numbness/tingling in R UE from ulnar nerve glides.      OBJECTIVE:   Observation:  Palpation: (+) TTP R UT   (+) UNTT ulnar      Test used Initial score Current Score   Pain Summary VAS 10    Functional questionnaire NDI 38%    ROM Cervical Flexion      Cervical Ext      Cervical SB R/L      Cervical rotation R/L     Strength                     RESTRICTIONS/PRECAUTIONS:  C5-C6 disc bulge, C6-C7 framinal stenosis     Exercises/Interventions:   Therapeutic Ex Sets/sec Reps Notes   UBE      T- band Row/pinch 1 10 YTB   T- band lower pinch 1 10 YTB   T- band ER activation      UT stretch  2 Levator stretch 1/30 2    Pec stretch at door  1/30  3    No money 10 2 GTB   Quadruped w cerv retract      Front plank      Side plank      Chin tuck 2 10 Seated and     Chin tuck w lift 2 10    Chin tuck w rotation      SBS  3 10    SB SBS 2 10    Prone scapular retraction  2 10    Ulnar nerve glide 10 10     Wall walks  2 10 Orang eloop          Manual Intervention      Cervical distraction  5 min   Decreased pain   Trigger point release/STM UT  10 min      Cervical Retraction  2x10      PA mobs cervical grade I-II 5 min   Pain relief   1st rib mob 2 min                 NMR re-education      T-spine Ext- foam roll      Chin tucks  3 10 With OP/AROM supine    Pt education       Edge of plnth plank+chin tuck  1 4 20\"    Prone walk out on SB 1 10 10\"          Traction                  Centralization of symptoms post MT. Therapeutic Exercise and NMR EXR  [x] (18639) Provided verbal/tactile cueing for activities related to strengthening, flexibility, endurance, ROM  for improvements in cervical, postural, scapular, scapulothoracic and UE control with self care, reaching, carrying, lifting, house/yardwork, driving/computer work.    [] (10951) Provided verbal/tactile cueing for activities related to improving balance, coordination, kinesthetic sense, posture, motor skill, proprioception  to assist with cervical, scapular, scapulothoracic and UE control with self care, reaching, carrying, lifting, house/yardwork, driving/computer work. Therapeutic Activities:    [] (28368 or 18845) Provided verbal/tactile cueing for activities related to improving balance, coordination, kinesthetic sense, posture, motor skill, proprioception and motor activation to allow for proper function of cervical, scapular, scapulothoracic and UE control with self care, carrying, lifting, driving/computer work.      Home Exercise Program:    [x] (00177) Reviewed/Progressed HEP activities related to strengthening, flexibility, endurance, comments)  [] Plan of care initiated [] Hold pending MD visit [] Discharge    Electronically signed by: Carmen Seay PT

## 2017-12-27 ENCOUNTER — OFFICE VISIT (OUTPATIENT)
Dept: ORTHOPEDIC SURGERY | Age: 57
End: 2017-12-27

## 2017-12-27 ENCOUNTER — HOSPITAL ENCOUNTER (OUTPATIENT)
Dept: PHYSICAL THERAPY | Age: 57
Discharge: HOME | End: 2017-12-27
Admitting: PHYSICAL MEDICINE & REHABILITATION

## 2017-12-27 VITALS
HEART RATE: 62 BPM | BODY MASS INDEX: 23.1 KG/M2 | SYSTOLIC BLOOD PRESSURE: 99 MMHG | DIASTOLIC BLOOD PRESSURE: 66 MMHG | WEIGHT: 143.74 LBS | HEIGHT: 66 IN

## 2017-12-27 DIAGNOSIS — M54.12 CERVICAL RADICULOPATHY: Primary | ICD-10-CM

## 2017-12-27 PROCEDURE — 3017F COLORECTAL CA SCREEN DOC REV: CPT | Performed by: PHYSICAL MEDICINE & REHABILITATION

## 2017-12-27 PROCEDURE — G8427 DOCREV CUR MEDS BY ELIG CLIN: HCPCS | Performed by: PHYSICAL MEDICINE & REHABILITATION

## 2017-12-27 PROCEDURE — G8484 FLU IMMUNIZE NO ADMIN: HCPCS | Performed by: PHYSICAL MEDICINE & REHABILITATION

## 2017-12-27 PROCEDURE — G8420 CALC BMI NORM PARAMETERS: HCPCS | Performed by: PHYSICAL MEDICINE & REHABILITATION

## 2017-12-27 PROCEDURE — 99212 OFFICE O/P EST SF 10 MIN: CPT | Performed by: PHYSICAL MEDICINE & REHABILITATION

## 2017-12-27 PROCEDURE — 1036F TOBACCO NON-USER: CPT | Performed by: PHYSICAL MEDICINE & REHABILITATION

## 2017-12-27 PROCEDURE — 3014F SCREEN MAMMO DOC REV: CPT | Performed by: PHYSICAL MEDICINE & REHABILITATION

## 2017-12-27 NOTE — PROGRESS NOTES
and trapezius. Paraspinal tenderness is present. There is no step-off or paraspinal spasm. · Range of Motion:No pain with cervical range of motion  · Strength: 5/5 bilateral upper extremities except 5-5 right triceps and right  strength, stable  · Special Tests:    ·   Spurling's, L'Hermitte's & Gibbs's negative bilaterally. ·   Kunz and Impingement tests are negative bilaterally. ·  Cubital and Carpal tunnel Tinel's negative bilaterally. · Skin:There are no rashes, ulcerations or lesions in right & left upper extremities. · Reflexes: Bilaterally triceps, biceps and brachioradialis are trace to +1. Clonus absent bilaterally at the feet. · Gait & station: Normal gait   · Additional Examinations:         · RIGHT UPPER EXTREMITY:  Inspection/examination of the right upper extremity does not show any tenderness, deformity or injury. Range of motion is full. There is no gross instability. There are no rashes, ulcerations or lesions. Strength and tone are normal.  · LEFT UPPER EXTREMITY: Inspection/examination of the left upper extremity does not show any tenderness, deformity or injury. Range of motion is full. There is no gross instability. There are no rashes, ulcerations or lesions.  Strength and tone are normal.    Diagnostic Testing:   MRI films report shows right C5 6 and C6 7 foraminal stenosis    Impression:  #1 resolving right he 7/8 radiculitis ×6 weeks      Plan:  #1 watch shoulder level above repetitive activities which may aggravate her neck  Notified me if she gets any progression and weakness of her triceps her right hand  Overall doing much better and continue activities to tolerance        F Ana Wilson

## 2017-12-27 NOTE — FLOWSHEET NOTE
[facility],  [address]  Phone: [phone]  Fax [phone]    Physical Therapy Daily Treatment Note  Date:  2017    Patient Name:  Rizwan Stein    :  1960  MRN: [id number]  Restrictions/Precautions:    Physician Information:    Referring Practitioner: Dr. Edilma Mayer   Medical/Treatment Diagnosis Information:     M54.12 cervical radiculopathy, M50.30 degenerative disc disease cervical  · Treatment Diagnosis:   M54.2 neck pain, M62.9 muscular tightness                                          [x] Conservative / [] Surgical - DOS:  Therapy Diagnosis/Practice Pattern:  Practice Pattern F: Spinal Disorders  Insurance/Certification information:    PT Insurance Information: [facility]  Plan of care signed: [x] YES  [] NO  Number of Comorbidities:  []0     [x]1-2    []3+  Date of Patient follow up with Physician:     G-Code (if applicable):      Date G-Code Applied: 17        Progress Note: []  Yes  [x]  No  Next due by: Visit #10        Latex Allergy:  [x]NO      []YES  Preferred Language for Healthcare:   [x]English       []other:    Visit # Insurance Allowable Reporting Period    does not require auth Begin Date: 2017               End Date:      RECERT DUE BY: 04    SUBJECTIVE:  Pt reports \"no pain and decreased numbness in hands. ONly some n/t in fingertips. \"     OBJECTIVE:   Observation:  Palpation: (+) TTP R UT   (+) UNTT ulnar      Test used Initial score Current Score   Pain Summary VAS 10    Functional questionnaire NDI 38%    ROM Cervical Flexion 40     Cervical Ext 60     Cervical SB R/L 45/30     Cervical rotation R/L 55/55    Strength Shoulder abd 4-           UE dermatome  Light touch  Decreased sensation C4-C7    Posture   FHP and bilat shoulder IR        RESTRICTIONS/PRECAUTIONS:  C5-C6 disc bulge, C6-C7 framinal stenosis     Exercises/Interventions:   Therapeutic Ex Sets/sec Reps Notes   UBE      T- band Row/pinch 1 10 YTB   T- band lower pinch 1 10 YTB   T- band ER activation      UT stretch 1/30 2    Levator stretch 1/30 2    Pec stretch at door  1/30  3    No money 10 2 GTB   Quadruped w cerv retract      Front plank      Side plank      Chin tuck 2 10 Seated and     Chin tuck w lift 2 10 5\" lift    Chin tuck w rotation      SBS  3 10    SB SBS 2 10    Prone scapular retraction  2 10    Ulnar nerve glide 10 10     Wall walks  2 10 Orang eloop          Manual Intervention      Cervical distraction  5 min   Decreased pain   Trigger point release/STM UT  10 min      Cervical Retraction  2x10      PA mobs cervical grade I-II 5 min   Pain relief   1st rib mob 2 min  dnd                NMR re-education      T-spine Ext- foam roll      Chin tucks  3 10 With OP/AROM supine    Pt education       Edge of plnth plank+chin tuck  1 4 20\"    Prone walk out on SB 1 10 10\"          Traction                  Centralization of symptoms post MT. Therapeutic Exercise and NMR EXR  [x] (41452) Provided verbal/tactile cueing for activities related to strengthening, flexibility, endurance, ROM  for improvements in cervical, postural, scapular, scapulothoracic and UE control with self care, reaching, carrying, lifting, house/yardwork, driving/computer work.    [] (60879) Provided verbal/tactile cueing for activities related to improving balance, coordination, kinesthetic sense, posture, motor skill, proprioception  to assist with cervical, scapular, scapulothoracic and UE control with self care, reaching, carrying, lifting, house/yardwork, driving/computer work.     Therapeutic Activities:    [] (45273 or 93838) Provided verbal/tactile cueing for activities related to improving balance, coordination, kinesthetic sense, posture, motor skill, proprioception and motor activation to allow for proper function of cervical, scapular, scapulothoracic and UE control with self care, carrying, lifting, driving/computer improvement in movement, function, and ADLs as indicated by Functional Deficits. Long Term Goals: To be achieved in: 10 weeks  1. Disability index score of 20% or less for the NDI to assist with reaching prior level of function. 2. Patient will demonstrate increased AROM to [facility] of cervical/thoracic spine to allow for proper joint functioning as indicated by patients Functional Deficits. 3. Patient will demonstrate an increase in postural awareness and control and activation of  Deep cervical stabilizers to allow for proper functional mobility as indicated by patients Functional Deficits. 4. Patient will return to sleeping, gripping objects functional activities without increased symptoms or restriction. 5. Patient will return to playing tennis and crocheting with decreased pain and limitations from her neck and R UE. New or Updated Goals (if applicable):  [x] No change to goals established upon initial eval/last progress note:  New Goals:    Progression Towards Functional goals:   [x] Patient is progressing as expected towards functional goals listed. [] Progression is slowed due to complexities listed. [] Progression has been slowed due to co-morbidities. [] Plan just implemented, too soon to assess goals progression  [] Other:     ASSESSMENT:    [] Improvement noted relative to goals:  [] No Improvement noted related to goals:  Summary/Patient's response to treatment: Pt tolerated treatment session well with decreased pain s/s post MT. Pt tolerated increased ulnar nerve glides and reporting decreased n/t in 4th and 5th digits. Pt has decreased pain/limitaitons, possible DC with HEP and health plex pass next visit.      Treatment/Activity Tolerance:  [x] Patient tolerated treatment well [] Patient limited by fatique  [] Patient limited by pain  [] Patient limited by other medical complications  [] Other:     Prognosis: [x] Good [] Fair  [] Poor    Patient Requires Follow-up: [x] Yes  [] No    PLAN: See eval  [x] Continue per plan of care [] Alter current plan (see comments)  [] Plan of care initiated [] Hold pending MD visit [] Discharge    Electronically signed by: Mayur Mondragon PT

## 2017-12-29 ENCOUNTER — HOSPITAL ENCOUNTER (OUTPATIENT)
Dept: PHYSICAL THERAPY | Age: 57
Discharge: HOME | End: 2017-12-29
Admitting: PHYSICAL MEDICINE & REHABILITATION

## 2017-12-29 NOTE — FLOWSHEET NOTE
stenosis     Exercises/Interventions:   Therapeutic Ex Sets/sec Reps Notes   UBE      T- band Row/pinch 1 10 YTB   T- band lower pinch 1 10 YTB   T- band ER activation      UT stretch 1/30 2    Levator stretch 1/30 2    Pec stretch at door  1/30  3    No money 10 2 GTB   Quadruped w cerv retract      Front plank      Side plank      Chin tuck 2 10 Seated and     Chin tuck w lift 2 10 5\" lift    Chin tuck w rotation      SBS  3 10    SB SBS 2 10    Prone scapular retraction  2 10 3\"   Ulnar nerve glide 10 10     Wall walks  2 10 Orang eloop          Manual Intervention      Cervical distraction    Decreased pain   Trigger point release/STM UT  10 min      Cervical Retraction  2x10      PA mobs cervical grade I-II 5 min   Pain relief   1st rib mob 2 min  dnd                NMR re-education      T-spine Ext- foam roll      Chin tucks  3 10 With OP/AROM supine    Pt education       Edge of plnth plank+chin tuck  1 4 20\"    Prone walk out on SB 1 10 10\"          Traction                  Pt ed on HEP       Therapeutic Exercise and NMR EXR  [x] (14019) Provided verbal/tactile cueing for activities related to strengthening, flexibility, endurance, ROM  for improvements in cervical, postural, scapular, scapulothoracic and UE control with self care, reaching, carrying, lifting, house/yardwork, driving/computer work.    [] (06651) Provided verbal/tactile cueing for activities related to improving balance, coordination, kinesthetic sense, posture, motor skill, proprioception  to assist with cervical, scapular, scapulothoracic and UE control with self care, reaching, carrying, lifting, house/yardwork, driving/computer work.     Therapeutic Activities:    [] (67412 or 82664) Provided verbal/tactile cueing for activities related to improving balance, coordination, kinesthetic sense, posture, motor skill, proprioception and motor activation to allow for proper function of cervical, scapular, scapulothoracic and UE control with self have a decrease in pain to facilitate improvement in movement, function, and ADLs as indicated by Functional Deficits. Long Term Goals: To be achieved in: 10 weeks  1. Disability index score of 20% or less for the NDI to assist with reaching prior level of function. 2. Patient will demonstrate increased AROM to [facility] of cervical/thoracic spine to allow for proper joint functioning as indicated by patients Functional Deficits. 3. Patient will demonstrate an increase in postural awareness and control and activation of  Deep cervical stabilizers to allow for proper functional mobility as indicated by patients Functional Deficits. 4. Patient will return to sleeping, gripping objects functional activities without increased symptoms or restriction. 5. Patient will return to playing tennis and crocheting with decreased pain and limitations from her neck and R UE. New or Updated Goals (if applicable):  [x] No change to goals established upon initial eval/last progress note:  New Goals:    Progression Towards Functional goals:   [x] Patient is progressing as expected towards functional goals listed. [] Progression is slowed due to complexities listed. [] Progression has been slowed due to co-morbidities. [] Plan just implemented, too soon to assess goals progression  [] Other:     ASSESSMENT:    [] Improvement noted relative to goals:  [] No Improvement noted related to goals:  Summary/Patient's response to treatment: Pt tolerated treatment session well with decreased pain s/s post MT. Pt tolerated increased ulnar nerve glides and reporting decreased n/t in 4th and 5th digits. Pt going on vacation next week scheduling visit for following week. Pt has decreased pain/limitaitons, possible DC with HEP and health plex pass next visit if able to maintain decreased/absent pain levels with HEP.      Treatment/Activity Tolerance:  [x] Patient tolerated treatment well [] Patient limited by shimon  [] Patient limited by pain  [] Patient limited by other medical complications  [] Other:     Prognosis: [x] Good [] Fair  [] Poor    Patient Requires Follow-up: [x] Yes  [] No    PLAN: See eval  [x] Continue per plan of care [] Alter current plan (see comments)  [] Plan of care initiated [] Hold pending MD visit [] Discharge    Electronically signed by: Jose F Quiñones PT

## 2018-01-01 ENCOUNTER — HOSPITAL ENCOUNTER (OUTPATIENT)
Dept: PHYSICAL THERAPY | Age: 58
Discharge: HOME | End: 2018-01-31
Attending: PHYSICAL MEDICINE & REHABILITATION | Admitting: PHYSICAL MEDICINE & REHABILITATION

## 2018-01-29 ENCOUNTER — HOSPITAL ENCOUNTER (OUTPATIENT)
Dept: PHYSICAL THERAPY | Age: 58
Discharge: HOME | End: 2018-01-29
Admitting: PHYSICAL MEDICINE & REHABILITATION

## 2018-01-29 NOTE — FLOWSHEET NOTE
[facility], [address]  Phone: [phone]  Fax [phone]    Physical Therapy Daily Treatment Note  Date:  2018    Patient Name:  Rachel Carpenter    :  1960  MRN: [id number]  Restrictions/Precautions:    Physician Information:    Referring Practitioner: Dr. Samia Howard   Medical/Treatment Diagnosis Information:     M54.12 cervical radiculopathy, M50.30 degenerative disc disease cervical  · Treatment Diagnosis:   M54.2 neck pain, M62.9 muscular tightness                                          [x] Conservative / [] Surgical - DOS:  Therapy Diagnosis/Practice Pattern:  Practice Pattern F: Spinal Disorders  Insurance/Certification information:    PT Insurance Information: [address] [place] [address] signed: [x] YES  [] NO  Number of Comorbidities:  []0     [x]1-2    []3+  Date of Patient follow up with Physician:     G-Code (if applicable):      Date G-Code Applied: 18   PT G-Codes  Functional Assessment Tool Used: NDI   Score: 6%  Functional Limitation: Carrying, moving and handling objects  Carrying, Moving and Handling Objects Current Status (): At least 1 percent but less than 20 percent impaired, limited or restricted  Carrying, Moving and Handling Objects Goal Status (): At least 1 percent but less than 20 percent impaired, limited or restricted    Progress Note: []  Yes  [x]  No  Next due by: Visit #10        Latex Allergy:  [x]NO      []YES  Preferred Language for Healthcare:   [x]English       []other:    Visit # Insurance Allowable Reporting Period    does not require auth Begin Date: 2018               End Date:      RECERT DUE BY: 73    SUBJECTIVE:  Pt just returned home from out of town. Pt reports that her neck is feeling  \"great. \" Neck did not give her any problems. Reports that she feels like she has decreased  strength in her R hand and that is her main complaint.

## 2018-02-01 ENCOUNTER — HOSPITAL ENCOUNTER (OUTPATIENT)
Dept: PHYSICAL THERAPY | Age: 58
Discharge: HOME | End: 2018-02-28
Attending: PHYSICAL MEDICINE & REHABILITATION | Admitting: PHYSICAL MEDICINE & REHABILITATION

## 2018-09-13 ENCOUNTER — OFFICE VISIT (OUTPATIENT)
Dept: ORTHOPEDIC SURGERY | Age: 58
End: 2018-09-13

## 2018-09-13 VITALS — WEIGHT: 147 LBS | HEIGHT: 66 IN | BODY MASS INDEX: 23.63 KG/M2

## 2018-09-13 DIAGNOSIS — M75.21 BICEPS TENDINITIS OF RIGHT UPPER EXTREMITY: ICD-10-CM

## 2018-09-13 DIAGNOSIS — M25.521 ELBOW PAIN, RIGHT: Primary | ICD-10-CM

## 2018-09-13 PROCEDURE — G8420 CALC BMI NORM PARAMETERS: HCPCS | Performed by: ORTHOPAEDIC SURGERY

## 2018-09-13 PROCEDURE — G8427 DOCREV CUR MEDS BY ELIG CLIN: HCPCS | Performed by: ORTHOPAEDIC SURGERY

## 2018-09-13 PROCEDURE — 3017F COLORECTAL CA SCREEN DOC REV: CPT | Performed by: ORTHOPAEDIC SURGERY

## 2018-09-13 PROCEDURE — 99214 OFFICE O/P EST MOD 30 MIN: CPT | Performed by: ORTHOPAEDIC SURGERY

## 2018-09-13 PROCEDURE — 1036F TOBACCO NON-USER: CPT | Performed by: ORTHOPAEDIC SURGERY

## 2018-09-13 RX ORDER — METHYLPREDNISOLONE 4 MG/1
TABLET ORAL
Qty: 1 KIT | Refills: 0 | Status: SHIPPED | OUTPATIENT
Start: 2018-09-13 | End: 2021-12-09 | Stop reason: ALTCHOICE

## 2018-09-13 NOTE — PROGRESS NOTES
CHIEF COMPLAINT:    Chief Complaint   Patient presents with    Elbow Pain     forearm/bicep muscle pain after playing tennis 9/10/18- has been treated before for tennis elbow and states this pain is different        HISTORY OF PRESENT ILLNESS:                The patient is a 62 y.o. female  Who presents to the clinic for evaluation of right elbow pain. She noticed this pain after playing tennis on 09/10/2018. She points to the distal biceps tendon and proximal forearm at the primary location of her pain. Pain is worsened with activity and relieved by rest.  She doesn't experience pain in this location when playing tennis. She does report a history of cervical radiculitis about 1 ago for which she was seen and evaluated by Dr. Issa Rodriguez. She states she does have some radiating pain down the arm in general he feels weakness in the right hand when compared to the LEFT. She has an appointment tomorrow to see Mery Caba     Past Medical History:   Diagnosis Date    H/O Graves' disease           The pain assessment was noted & is as follows:  Pain Assessment  Location of Pain: Arm  Severity of Pain: 1  Quality of Pain: Aching, Dull  Duration of Pain: Persistent  Frequency of Pain: Intermittent  Aggravating Factors: Bending, Straightening, Exercise, Stretching (playing tennis)  Limiting Behavior: Yes  Relieving Factors: Rest  Result of Injury: No  Work-Related Injury: No  Are there other pain locations you wish to document?: No]      Work Status/Functionality:     Past Medical History: Medical history form was reviewed today & can be found in the media tab  Past Medical History:   Diagnosis Date    H/O Graves' disease       Past Surgical History:     History reviewed. No pertinent surgical history. Current Medications:     Current Outpatient Prescriptions:     gabapentin (NEURONTIN) 300 MG capsule, 1 po  qhs, Disp: 30 capsule, Rfl: 0  Allergies:  Patient has no known allergies.   Social History:    reports

## 2018-09-14 ENCOUNTER — OFFICE VISIT (OUTPATIENT)
Dept: ORTHOPEDIC SURGERY | Age: 58
End: 2018-09-14

## 2018-09-14 ENCOUNTER — HOSPITAL ENCOUNTER (OUTPATIENT)
Dept: PHYSICAL THERAPY | Age: 58
Setting detail: THERAPIES SERIES
Discharge: HOME | End: 2018-09-14
Payer: COMMERCIAL

## 2018-09-14 VITALS
WEIGHT: 143 LBS | BODY MASS INDEX: 22.98 KG/M2 | SYSTOLIC BLOOD PRESSURE: 121 MMHG | DIASTOLIC BLOOD PRESSURE: 67 MMHG | HEIGHT: 66 IN | HEART RATE: 68 BPM

## 2018-09-14 DIAGNOSIS — R29.898 RIGHT HAND WEAKNESS: ICD-10-CM

## 2018-09-14 DIAGNOSIS — M54.2 NECK PAIN: Primary | ICD-10-CM

## 2018-09-14 DIAGNOSIS — M48.02 FORAMINAL STENOSIS OF CERVICAL REGION: ICD-10-CM

## 2018-09-14 DIAGNOSIS — M50.30 DDD (DEGENERATIVE DISC DISEASE), CERVICAL: ICD-10-CM

## 2018-09-14 PROCEDURE — G8984 CARRY CURRENT STATUS: HCPCS | Performed by: PHYSICAL THERAPIST

## 2018-09-14 PROCEDURE — G8427 DOCREV CUR MEDS BY ELIG CLIN: HCPCS | Performed by: PHYSICIAN ASSISTANT

## 2018-09-14 PROCEDURE — 99214 OFFICE O/P EST MOD 30 MIN: CPT | Performed by: PHYSICIAN ASSISTANT

## 2018-09-14 PROCEDURE — G0283 ELEC STIM OTHER THAN WOUND: HCPCS | Performed by: PHYSICAL THERAPIST

## 2018-09-14 PROCEDURE — 1036F TOBACCO NON-USER: CPT | Performed by: PHYSICIAN ASSISTANT

## 2018-09-14 PROCEDURE — 97110 THERAPEUTIC EXERCISES: CPT | Performed by: PHYSICAL THERAPIST

## 2018-09-14 PROCEDURE — 97112 NEUROMUSCULAR REEDUCATION: CPT | Performed by: PHYSICAL THERAPIST

## 2018-09-14 PROCEDURE — G8985 CARRY GOAL STATUS: HCPCS | Performed by: PHYSICAL THERAPIST

## 2018-09-14 PROCEDURE — 97162 PT EVAL MOD COMPLEX 30 MIN: CPT | Performed by: PHYSICAL THERAPIST

## 2018-09-14 PROCEDURE — G8420 CALC BMI NORM PARAMETERS: HCPCS | Performed by: PHYSICIAN ASSISTANT

## 2018-09-14 PROCEDURE — 3017F COLORECTAL CA SCREEN DOC REV: CPT | Performed by: PHYSICIAN ASSISTANT

## 2018-09-14 NOTE — PLAN OF CARE
[facility], [address], [address] [place] [address]  Phone: [phone]  Fax [phone]   Physical Therapy Certification    Dear Referring Practitioner: Tammy Rodriguez ,    We had the pleasure of evaluating the following patient for physical therapy services at [address] [place] [address]. A summary of our findings can be found in the initial assessment below. This includes our plan of care. If you have any questions or concerns regarding these findings, please do not hesitate to contact me at the office phone number checked above. Thank you for the referral.       Physician Signature:_______________________________Date:__________________  By signing above (or electronic signature), therapists plan is approved by physician    Patient: Timo Toure   : 1960   MRN: [id number]  Referring Physician: Referring Practitioner: Tammy Rodriguez       Evaluation Date: 2018      Medical Diagnosis Information:  Diagnosis: M99.81 (ICD-10-CM) - Foraminal stenosis of cervical region, M50.30 (ICD-10-CM) - DDD (degenerative disc disease), cervical, M54.2 (ICD-10-CM) - Neck pain, R29.898 (ICD-10-CM) - Right hand weakness   Treatment Diagnosis: M54.2 (ICD-10-CM) - Neck pain, R29.898 (ICD-10-CM) - Right hand weakness                                         Insurance information:  [facility]- 80/20; 30 visits     Precautions/ Contra-indications: none  Latex Allergy:  [x]NO      []YES  Preferred Language for Healthcare:   [x]English       []other:    SUBJECTIVE: Patient stated complaint: Pt reports sx's were brought on doing increased lifting and tennis/ pickleball 7-10 days. She has tried heat and some stretching. Cannot distinguish positional changes. Saw PRICILLA, recommending therapy and EMG. F/u after for results.      Relevant Medical History: tennis elbow (R)  Functional Disability Index: PT G-Codes  Functional Assessment Tool Used: Q DASH  Score: 36%  Functional Limitation: Carrying, moving and handling objects  Carrying, Moving and Handling Objects Current Status (): At least 20 percent but less than 40 percent impaired, limited or restricted  Carrying, Moving and Handling Objects Goal Status (): At least 1 percent but less than 20 percent impaired, limited or restricted    Pain Scale: 3-5/10  Easing factors: heat   Provocative factors: using arm, gripping, impact     Type: []Constant   [x]Intermittent  [x]Radiating []Localized []other:     Numbness/Tingling: not currently    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    Living Status/Prior Level of Function: Independent with ADLs and IADLs, tennis/ pickleball    OBJECTIVE:     CERV ROM     Cervical Flexion 60 (radicular sx)    Cervical Extension 40 (radicular sxs)    Cervical SB     Cervical rotation          ROM Left Right   Shoulder Flex     Shoulder Abd     Shoulder ER     Shoulder IR               Strength  Left Right   Shoulder Flex  5-   Shoulder Scap     Shoulder ER  4   Shoulder IR  4   Wrist flex  Wrist ext  Thumb abd  5  4+  3+    65 kg 62.5 kg   Reflexes Left Right   C5-6 Biceps     C5-6 Brachioradialis     C7-8 Triceps       Reflexes/Sensation:    [x]Dermatomes/Myotomes intact    []Reflexes equal and normal bilaterally   []Other:    Joint mobility: not tested   []Normal    []Hypo   []Hyper    Palpation: not grossly TTP    Functional Mobility/Transfers: ind. Posture: forward shoulders/ increased cervical lordsis    Bandages/Dressings/Incisions: NA    Gait: (include devices/WB status): WNL     Orthopedic Special Tests: positional preference: unloaded flexion                       [x] Patient history, allergies, meds reviewed. Medical chart reviewed. See intake form. Review Of Systems (ROS):  [x]Performed Review of systems (Integumentary, CardioPulmonary, Neurological) by intake and observation.  Intake form has been scanned into medical percent impaired, limited or restricted  Carrying, Moving and Handling Objects Goal Status (): At least 1 percent but less than 20 percent impaired, limited or restricted    ASSESSMENT:    Functional Impairments:     [x]Noted cervical/thoracic/GHJ joint hypomobility   []Noted cervical/thoracic/GHJ joint hypermobility   [x]Decreased cervical/UE functional ROM   [x]Noted Headache pain aggravated by neck movements with/without dizziness   [x]Abnormal reflexes/sensation/myotomal/dermatomal deficits   [x]Decreased DCF control or ability to hold head up   [x]Decreased RC/scapular/core strength and neuromuscular control    [x]Decreased UE functional strength   []other:      Functional Activity Limitations (from functional questionnaire and intake)   [x]Reduced ability to tolerate prolonged functional positions   []Reduced ability or difficulty with changes of positions or transfers between positions   []Reduced ability to maintain good posture and demonstrate good body mechanics with sitting, bending, and lifting   [x] Reduced ability or tolerance with driving and/or computer work   [x]Reduced ability to perform lifting, reaching, carrying tasks   []Reduced ability to concentrate   []Reduced ability to sleep    [x]Reduced ability to tolerate any impact through UE or spine   [x]Reduced ability to ambulate prolonged functional periods/distances   []other:    Participation Restrictions   [x]Reduced participation in self care activities   [x]Reduced participation in home management activities   [x]Reduced participation in work activities   [x]Reduced participation in social activities. []Reduced participation in sport/recreational activities.     Classification/Subgrouping:   []signs/symptoms consistent with neck pain with mobility deficits     []signs/symptoms consistent with neck pain with movement coordinated impairments    [x]signs/symptoms consistent with neck pain with radiating pain    []signs/symptoms consistent

## 2018-09-14 NOTE — PROGRESS NOTES
lesions. · LEFT UPPER EXTREMITY: Inspection/examination of the left upper extremity does not show any tenderness, deformity or injury. Range of motion is full. There is no gross instability. There are no rashes, ulcerations or lesions.  Strength and tone are normal.    Diagnostic Testin view cervical spine 2018 straightening of cervical lordosis with moderate to severe multilevel DDD C4 to C7, varying degrees of right foraminal stenosis    Cervical MRI scan report independently reviewed from 2017 showing severe right foraminal stenosis C6 7, moderate to severe right foraminal stenosis C5 6, multilevel mild central narrowing & anterior cord compression          Impression:  #1 Acute/chronic neck/trap pain, right cervical radiculitis w/right  weakness  #2 Severe right FS C6-7, mod C5-6   #3 Right distal biceps tendinitis--Dr. Shah [employer]      Plan:  #1 Right UE EMG  #2 PT for above  #3 Cont MDP as planned  #4 F/u after above         [facility]

## 2018-09-14 NOTE — FLOWSHEET NOTE
[facility],  [address]  Phone: [phone]  Fax [phone]      Physical Therapy Daily Treatment Note  Date:  2018    Patient Name:  Dhruv Tineo    :  1960  MRN: [id number]  Restrictions/Precautions:    Medical/Treatment Diagnosis Information:  Diagnosis: M99.81 (ICD-10-CM) - Foraminal stenosis of cervical region, M50.30 (ICD-10-CM) - DDD (degenerative disc disease), cervical, M54.2 (ICD-10-CM) - Neck pain, R29.898 (ICD-10-CM) - Right hand weakness  Treatment Diagnosis: M54.2 (ICD-10-CM) - Neck pain, R29.898 (ICD-10-CM) - Right hand weakness  Insurance/Certification information:  [facility] 80/20; 30 visits  Physician Information:  Referring Practitioner: Twila King   Plan of care signed (Y/N):     Date of Patient follow up with Physician: after EMG    G-Code (if applicable):      Date G-Code Applied: 2018   PT G-Codes  Functional Assessment Tool Used: Q DASH  Score: 36%  Functional Limitation: Carrying, moving and handling objects  Carrying, Moving and Handling Objects Current Status (): At least 20 percent but less than 40 percent impaired, limited or restricted  Carrying, Moving and Handling Objects Goal Status ():  At least 1 percent but less than 20 percent impaired, limited or restricted    Progress Note: [x]  Yes  []  No  Next due by: Visit #10      Latex Allergy:  [x]NO      []YES  Preferred Language for Healthcare:   [x]English       []other:    Visit # Insurance Allowable Requires auth   1 30    []no        []yes:     Pain level:  3-5/10     SUBJECTIVE:  See eval    OBJECTIVE: See eval  Observation:   Test measurements:      RESTRICTIONS/PRECAUTIONS: flexion bias    Exercises/Interventions:   Therapeutic Ex Sets/sec Reps Notes   UBE      T- band Row/pinch      T- band lower pinch      T- band ER activation      UT stretch      Levator stretch      Isometric at wall      Quadruped self care, reaching, carrying, lifting, house/yardwork, driving/computer work      Manual Treatments:  PROM / STM / Oscillations-Mobs:  G-I, II, III, IV (PA's, Inf., Post.)  [] (53229) Provided manual therapy to mobilize soft tissue/joints of cervical/CT, scapular GHJ and UE for the purpose of decreasing headache, modulating pain, promoting relaxation,  increasing ROM, reducing/eliminating soft tissue swelling/inflammation/restriction, improving soft tissue extensibility and allowing for proper ROM for normal function with self care, reaching, carrying, lifting, house/yardwork, driving/computer work    Modalities:  UES/ MHP x 15 mins    Charges:  Timed Code Treatment Minutes: 25   Total Treatment Minutes: 60     [] EVAL (LOW) 47480 (typically 20 minutes face-to-face)  [x] EVAL (MOD) 74434 (typically 30 minutes face-to-face)  [] EVAL (HIGH) 53462 (typically 45 minutes face-to-face)  [] RE-EVAL     [x] JK(37831) x  1   [] IONTO  [x] NMR (57047) x  1   [] VASO  [] Manual (59212) x       [] Other:  [] TA x       [] Mech Traction (60521)  [] ES(attended) (24246)      [x] ES (un) (05541):     GOALS:  Patient stated goal: eliminate arm sx's      Therapist goals for Patient:   Short Term Goals: To be achieved in: 2 weeks  1. Independent in HEP and progression per patient tolerance, in order to prevent re-injury. 2. Patient will have a decrease in pain to facilitate improvement in movement, function, and ADLs as indicated by Functional Deficits.     Long Term Goals: To be achieved in: 6-8 weeks  1. Disability index score of 10% or less for the Quick DASH to assist with reaching prior level of function. 2. Patient will demonstrate increased AROM to [facility] of cervical/thoracic spine to allow for proper joint functioning as indicated by patients Functional Deficits.    3. Patient will demonstrate an increase in postural awareness and control and activation of  Deep cervical stabilizers to allow for proper functional mobility as

## 2018-09-21 ENCOUNTER — OFFICE VISIT (OUTPATIENT)
Dept: ORTHOPEDIC SURGERY | Age: 58
End: 2018-09-21

## 2018-09-21 ENCOUNTER — HOSPITAL ENCOUNTER (OUTPATIENT)
Dept: PHYSICAL THERAPY | Age: 58
Setting detail: THERAPIES SERIES
Discharge: HOME | End: 2018-09-21
Payer: COMMERCIAL

## 2018-09-21 DIAGNOSIS — G56.31 NEUROPATHY OF RIGHT RADIAL NERVE: Primary | ICD-10-CM

## 2018-09-21 PROCEDURE — G0283 ELEC STIM OTHER THAN WOUND: HCPCS | Performed by: PHYSICAL THERAPIST

## 2018-09-21 PROCEDURE — 95908 NRV CNDJ TST 3-4 STUDIES: CPT | Performed by: PHYSICAL MEDICINE & REHABILITATION

## 2018-09-21 PROCEDURE — 97140 MANUAL THERAPY 1/> REGIONS: CPT | Performed by: PHYSICAL THERAPIST

## 2018-09-21 PROCEDURE — 97012 MECHANICAL TRACTION THERAPY: CPT | Performed by: PHYSICAL THERAPIST

## 2018-09-21 PROCEDURE — 97110 THERAPEUTIC EXERCISES: CPT | Performed by: PHYSICAL THERAPIST

## 2018-09-21 PROCEDURE — 95886 MUSC TEST DONE W/N TEST COMP: CPT | Performed by: PHYSICAL MEDICINE & REHABILITATION

## 2018-09-21 NOTE — PROGRESS NOTES
[address] MEDICINE      Patient: Davion Srinivasan Age: 62 Years 2 Months  Sex: Female Date: 9/21/2018  YOB: 1960 Ref. Phys.: Terrell Porter  Notes:  right hand weakness; r/o radiculopathy      Sensory NCS      Nerve / Sites Peak PeakAmp Dist Fabrice    ms µV cm m/s   R MEDIAN - D2 ULNAR D5   1. Median Wrist 3.00 21.5 14 57.1   2. Ulnar Wrist 3.15 32.3 14 58.3   R MEDIAN - RADIAL THUMB   1. Median Wrist       2. Radial Wrist 2.15 29.8 10 58.8       Motor NCS      Nerve / Sites Lat Amp Amp Dist Fabrice    ms mV % cm m/s   R MEDIAN - APB   1. Wrist 3.20 7.8 100 8    2. Elbow 7.10 7.4 95.2 26 66.7   R ULNAR - ADM   1. Wrist 3.00 8.4 100 8    2. B. Elbow 5.95 8.2 97.4 20 67.8   3. A. Elbow 7.70 7.7 92 10 57. 1       EMG Summary Table     Spontaneous MUAP Recruit. Ins. Act Fibs. PSW Fasics. H.F. Amp. Dur. Poly's. Pattern   R. FIRST D INTEROSS N None None None None N N N N   R. PRON TERES N None None None None N N N N   R. EXT DIG COMM N None None None None ++ ++ ++ N   R. EXT CARPI R BREV N None None None None N N N N   R. TRICEPS N None None None None N N N N   R. BICEPS N None None None None N N N N   R. DELTOID N None None None None N N N N   R. CERV PSP (L) N None None None None N N N N   R. EXT INDICIS N None None None None ++ ++ + -       Summary: Nerve conduction studies are normal.  Monopolar exam shows large amplitude increased duration motor units to left posterior interosseous muscles. The remainder of the nerve conduction studies and monopolar exam are normal, as recorded above. Impression: Abnormal examination. There is electrodiagnostic evidence of:    1. 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 No acute or chronic right cervical radiculopathy  3.  No evidence of any other right upper extremity mononeuropathy, plexopathy, or radiculopathy            Haile Carmen MD

## 2018-09-21 NOTE — FLOWSHEET NOTE
driving/computer work      Manual Treatments:  PROM / STM / Oscillations-Mobs:  G-I, II, III, IV (PA's, Inf., Post.)  [x] (95162) Provided manual therapy to mobilize soft tissue/joints of cervical/CT, scapular GHJ and UE for the purpose of decreasing headache, modulating pain, promoting relaxation,  increasing ROM, reducing/eliminating soft tissue swelling/inflammation/restriction, improving soft tissue extensibility and allowing for proper ROM for normal function with self care, reaching, carrying, lifting, house/yardwork, driving/computer work    Modalities:  UES/ MHP x 15 mins supine    Charges:  Timed Code Treatment Minutes: 25   Total Treatment Minutes: 55     [] EVAL (LOW) 87517 (typically 20 minutes face-to-face)  [] EVAL (MOD) 80235 (typically 30 minutes face-to-face)  [] EVAL (HIGH) 79649 (typically 45 minutes face-to-face)  [] RE-EVAL     [x] KU(21839) x  1   [] IONTO  [] NMR (25767) x      [] VASO  [x] Manual (51410) x  1    [] Other:  [] TA x       [x] Mech Traction (99336)  [] ES(attended) (88891)      [x] ES (un) (28744):     GOALS:  Patient stated goal: eliminate arm sx's      Therapist goals for Patient:   Short Term Goals: To be achieved in: 2 weeks  1. Independent in HEP and progression per patient tolerance, in order to prevent re-injury. 2. Patient will have a decrease in pain to facilitate improvement in movement, function, and ADLs as indicated by Functional Deficits. - MET     Long Term Goals: To be achieved in: 6-8 weeks  1. Disability index score of 10% or less for the Quick DASH to assist with reaching prior level of function. 2. Patient will demonstrate increased AROM to [facility] of cervical/thoracic spine to allow for proper joint functioning as indicated by patients Functional Deficits.    3. Patient will demonstrate an increase in postural awareness and control and activation of  Deep cervical stabilizers to allow for proper functional mobility as indicated by patients Functional Deficits. 4. Patient will return to all functional activities without increased symptoms or restriction. 5. Pt will not eliminated arm sx's with activity. (patient specific functional goal)          Progression Towards Functional goals:  [] Patient is progressing as expected towards functional goals listed. [] Progression is slowed due to complexities listed. [] Progression has been slowed due to co-morbidities. [x] Plan just implemented, too soon to assess goals progression  [] Other:     ASSESSMENT:   Patient demonstrating postural awareness today with exercises. Tolerated postural / scapular exercises well without symptoms. Symptom free following treatment. Tolerated mechanical traction well.       Treatment/Activity Tolerance:  [x] Patient tolerated treatment well [] Patient limited by fatique  [] Patient limited by pain  [] Patient limited by other medical complications  [] Other:     Prognosis: [x] Good [] Fair  [] Poor    Patient Requires Follow-up: [x] Yes  [] No    PLAN: See eval  [x] Continue per plan of care [] Alter current plan (see comments)  [] Plan of care initiated [] Hold pending MD visit [] Discharge    Electronically signed by: Damon Lees, [address] [facility]

## 2018-09-28 ENCOUNTER — OFFICE VISIT (OUTPATIENT)
Dept: ORTHOPEDIC SURGERY | Age: 58
End: 2018-09-28
Payer: COMMERCIAL

## 2018-09-28 ENCOUNTER — HOSPITAL ENCOUNTER (OUTPATIENT)
Dept: PHYSICAL THERAPY | Age: 58
Setting detail: THERAPIES SERIES
Discharge: HOME | End: 2018-09-28
Payer: COMMERCIAL

## 2018-09-28 VITALS — BODY MASS INDEX: 22.98 KG/M2 | HEIGHT: 66 IN | WEIGHT: 143 LBS

## 2018-09-28 DIAGNOSIS — R29.898 WEAKNESS OF RIGHT HAND: ICD-10-CM

## 2018-09-28 DIAGNOSIS — S54.8X1S POSTERIOR INTEROSSEOUS NERVE INJURY, RIGHT, SEQUELA: ICD-10-CM

## 2018-09-28 PROCEDURE — 97110 THERAPEUTIC EXERCISES: CPT | Performed by: PHYSICAL THERAPIST

## 2018-09-28 PROCEDURE — G8427 DOCREV CUR MEDS BY ELIG CLIN: HCPCS | Performed by: ORTHOPAEDIC SURGERY

## 2018-09-28 PROCEDURE — 97140 MANUAL THERAPY 1/> REGIONS: CPT | Performed by: PHYSICAL THERAPIST

## 2018-09-28 PROCEDURE — G0283 ELEC STIM OTHER THAN WOUND: HCPCS | Performed by: PHYSICAL THERAPIST

## 2018-09-28 PROCEDURE — 3017F COLORECTAL CA SCREEN DOC REV: CPT | Performed by: ORTHOPAEDIC SURGERY

## 2018-09-28 PROCEDURE — 99243 OFF/OP CNSLTJ NEW/EST LOW 30: CPT | Performed by: ORTHOPAEDIC SURGERY

## 2018-09-28 PROCEDURE — G8420 CALC BMI NORM PARAMETERS: HCPCS | Performed by: ORTHOPAEDIC SURGERY

## 2018-09-28 PROCEDURE — 97012 MECHANICAL TRACTION THERAPY: CPT | Performed by: PHYSICAL THERAPIST

## 2018-09-28 NOTE — FLOWSHEET NOTE
[facility], [address]  Phone: [phone]  Fax [phone]      Physical Therapy Daily Treatment Note  Date:  2018    Patient Name:  Macario Ferreira    :  1960  MRN: [id number]  Restrictions/Precautions:    Medical/Treatment Diagnosis Information:  Diagnosis: M99.81 (ICD-10-CM) - Foraminal stenosis of cervical region, M50.30 (ICD-10-CM) - DDD (degenerative disc disease), cervical, M54.2 (ICD-10-CM) - Neck pain, R29.898 (ICD-10-CM) - Right hand weakness  Treatment Diagnosis: M54.2 (ICD-10-CM) - Neck pain, R29.898 (ICD-10-CM) - Right hand weakness  Insurance/Certification information:  [facility] 80/20; 30 visits  Physician Information:     Plan of care signed (Y/N):     Date of Patient follow up with Physician: after EMG    G-Code (if applicable):      Date G-Code Applied: 2018        Progress Note: [x]  Yes  []  No  Next due by: Visit #10      Latex Allergy:  [x]NO      []YES  Preferred Language for Healthcare:   [x]English       []other:    Visit # Insurance Allowable Requires auth   3 30    []no        []yes:     Pain level:  1-2/10     SUBJECTIVE:  Patient reports the weakness is still there at the original level. She notes less neck pain and reports no tingling in hand. MD note: The patient did recently have an updated EMG test which demonstrated no sites of distal compressive neuropathy in the hand or elbow, but there was moderate findings consistent with PIN neuropathy but without ongoing axonal loss. There were no signs of active cervical radiculopathy or other unusual polyneuropathy.     OBJECTIVE:   Observation:   Test measurements:  +Hans    RESTRICTIONS/PRECAUTIONS: flexion bias    Exercises/Interventions:   Therapeutic Ex Sets/sec Reps Notes   UBE      T- band Row/pinch RTB  20 x     T- band lower pinch RTB  20 x     No money      Levator stretch      SL ER  SL H ABD 2#/ 2 10 ea B

## 2018-09-28 NOTE — PROGRESS NOTES
Chief Complaint  New Patient (R HAND)      History of Present Illness:  Levy Altamirano is a 62 y.o. female. She presents today for a new hand surgery specially consultation at the request of Dr. Young Estes regarding her right hand and forearm. She reports that back in 2017 she had a fairly significant issue with cervical radiculopathy that was noted to be at C7. However, with conservative care, corticosteroids, and physical therapy she made significant improvement. 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 The patient did recently have an updated EMG test which demonstrated no sites of distal compressive neuropathy in the hand or elbow, but there was moderate findings consistent with PIN neuropathy but without ongoing axonal loss. There were no signs of active cervical radiculopathy or other unusual polyneuropathy. Medical History:  Patient's medications, allergies, past medical, surgical, social and family histories were reviewed and updated as appropriate. Review of Systems  Pertinent items are noted in HPI  Denies fever, chills, confusion, bowel/bladder active change. Review of systems reviewed from Patient History Form dated on 9/13/2018 and available in the patient's chart under the Media tab. Vital Signs  There were no vitals filed for this visit. General Exam:   Constitutional: Patient is adequately groomed with no evidence of malnutrition  Mental Status: The patient is oriented to time, place and person. The patient's mood and affect are appropriate.     Wrist Examination    Inspection:  The patient has no trophic change at the wrist but she does appear to have slightly decreased bulk along the 1st dorsal or osseous muscle when compared to the opposite left side.    Palpation:  There is no pain into the 1st webspace or over the carpal canal.  There is no tenderness over the hyperthenar aspect of the hand. There is no tenderness over the cubital tunnel with firm pressure over the ulnar nerve. There is some mild discomfort over the common extensor origin of both elbows. To a greater extent into the radial tunnel on the right side versus the left. Range of Motion:  The patient is able to demonstrate full range of motion at the elbow forearm wrist and fingers and thumb without hesitation    Strength: There is perhaps a trace decreased intrinsic firing on the right hand versus the left, but there is no resting Wartenberg sign. She has normal strength of the digital and thumb extensors on the right. Normal strength of biceps and triceps. Special Tests:  Provocative testing for carpal tunnel syndrome, cubital tunnel syndrome, and thoracic outlet syndrome are all negative and do not reproduce symptoms    Skin: There are no additional worrisome rashes, ulcerations or lesions. Sensation: normal    Circulation normal    Additional Comments:     Additional Examinations:  Left Upper Extremity: Examination of the left upper extremity does not show any tenderness, deformity or injury. Range of motion is unremarkable. There is no gross instability. There are no rashes, ulcerations or lesions. Strength and tone are normal.    Radiology:     X-rays obtained and reviewed in office:  Views     Location    Impression      Diagnostic Test Findings:        Assessment:  Persistent right hand weakness after cervical radiculopathy with EMG evidence of involvement of the muscles innervated by the posterior interosseous nerve    Impression:  Encounter Diagnoses   Name Primary?  Weakness of right hand     Posterior interosseous nerve injury, right, sequela        Office Procedures:  No orders of the defined types were placed in this encounter.       Treatment Plan:  I talked at length with the patient about these findings. At this juncture I am not convinced I'm seeing active elements of ongoing distal compressive neuropathy. It is my impression currently that what she is experiencing is some residual effects from her original episode with cervical radiculopathy. On the EMG testing it does reveal some lasting changes within both right extensor indicis proprius and right extensor digitorum communis. However, it is not detecting any active involvement at the 1st dorsal interosseous or at any level above the elbow. At this juncture she readily reports that she is able to play sports and to do her work and that the main issue is simply some residual weakness in  on the right hand. I am not currently convinced that a radial tunnel release or cubital tunnel release will necessarily improve this situation. However, I do think we need to be very watchful for any progression of symptoms it would either prompt repeat electrodiagnostic testing, or simply moving forward with a radial tunnel decompression and cubital tunnel decompression. I think she has good understanding of the situation and I will be happy to see her back for further evaluation and reconsideration moving forward. Please note that this transcription was created using voice recognition software. Any errors are unintentional and may be due to voice recognition transcription.

## 2018-10-05 ENCOUNTER — HOSPITAL ENCOUNTER (OUTPATIENT)
Dept: PHYSICAL THERAPY | Age: 58
Setting detail: THERAPIES SERIES
Discharge: HOME | End: 2018-10-05
Payer: COMMERCIAL

## 2018-10-05 PROCEDURE — 97012 MECHANICAL TRACTION THERAPY: CPT | Performed by: PHYSICAL THERAPIST

## 2018-10-05 PROCEDURE — 97140 MANUAL THERAPY 1/> REGIONS: CPT | Performed by: PHYSICAL THERAPIST

## 2018-10-05 PROCEDURE — G0283 ELEC STIM OTHER THAN WOUND: HCPCS | Performed by: PHYSICAL THERAPIST

## 2018-10-05 PROCEDURE — 97112 NEUROMUSCULAR REEDUCATION: CPT | Performed by: PHYSICAL THERAPIST

## 2018-10-05 PROCEDURE — 97110 THERAPEUTIC EXERCISES: CPT | Performed by: PHYSICAL THERAPIST

## 2018-10-10 ENCOUNTER — HOSPITAL ENCOUNTER (OUTPATIENT)
Dept: PHYSICAL THERAPY | Age: 58
Setting detail: THERAPIES SERIES
Discharge: HOME | End: 2018-10-10
Payer: COMMERCIAL

## 2018-10-10 PROCEDURE — 97140 MANUAL THERAPY 1/> REGIONS: CPT | Performed by: PHYSICAL THERAPIST

## 2018-10-10 PROCEDURE — G0283 ELEC STIM OTHER THAN WOUND: HCPCS | Performed by: PHYSICAL THERAPIST

## 2018-10-10 PROCEDURE — 97012 MECHANICAL TRACTION THERAPY: CPT | Performed by: PHYSICAL THERAPIST

## 2018-10-10 PROCEDURE — 97110 THERAPEUTIC EXERCISES: CPT | Performed by: PHYSICAL THERAPIST

## 2018-10-10 PROCEDURE — 97112 NEUROMUSCULAR REEDUCATION: CPT | Performed by: PHYSICAL THERAPIST

## 2018-10-12 ENCOUNTER — HOSPITAL ENCOUNTER (OUTPATIENT)
Dept: PHYSICAL THERAPY | Age: 58
Setting detail: THERAPIES SERIES
Discharge: HOME | End: 2018-10-12
Payer: COMMERCIAL

## 2018-10-12 PROCEDURE — G0283 ELEC STIM OTHER THAN WOUND: HCPCS | Performed by: PHYSICAL THERAPIST

## 2018-10-12 PROCEDURE — 97112 NEUROMUSCULAR REEDUCATION: CPT | Performed by: PHYSICAL THERAPIST

## 2018-10-12 PROCEDURE — 97140 MANUAL THERAPY 1/> REGIONS: CPT | Performed by: PHYSICAL THERAPIST

## 2018-10-12 PROCEDURE — 97110 THERAPEUTIC EXERCISES: CPT | Performed by: PHYSICAL THERAPIST

## 2018-10-12 PROCEDURE — 97012 MECHANICAL TRACTION THERAPY: CPT | Performed by: PHYSICAL THERAPIST

## 2018-10-12 NOTE — FLOWSHEET NOTE
[facility], [address]  Phone: [phone]  Fax [phone]      Physical Therapy Daily Treatment Note  Date:  10/12/2018    Patient Name:  Dakotah Darden    :  1960  MRN: [id number]  Restrictions/Precautions:    Medical/Treatment Diagnosis Information:  Diagnosis: M99.81 (ICD-10-CM) - Foraminal stenosis of cervical region, M50.30 (ICD-10-CM) - DDD (degenerative disc disease), cervical, M54.2 (ICD-10-CM) - Neck pain, R29.898 (ICD-10-CM) - Right hand weakness  Treatment Diagnosis: M54.2 (ICD-10-CM) - Neck pain, R29.898 (ICD-10-CM) - Right hand weakness  Insurance/Certification information:  [facility] 80/20; 30 visits  Physician Information:     Plan of care signed (Y/N):     Date of Patient follow up with Physician: after EMG    G-Code (if applicable):      Date G-Code Applied: 2018        Progress Note: [x]  Yes  []  No  Next due by: Visit #10      Latex Allergy:  [x]NO      []YES  Preferred Language for Healthcare:   [x]English       []other:    Visit # Insurance Allowable Requires auth    30    []no        []yes:     Pain level:  1-2/10     SUBJECTIVE:  Pt reports improvement in hand overall- maybe less intermittent sx's. Feeling better. MD note: The patient did recently have an updated EMG test which demonstrated no sites of distal compressive neuropathy in the hand or elbow, but there was moderate findings consistent with PIN neuropathy but without ongoing axonal loss. There were no signs of active cervical radiculopathy or other unusual polyneuropathy.   Persistent right hand weakness after cervical radiculopathy with EMG evidence of involvement of the muscles innervated by the posterior interosseous nerve    OBJECTIVE:   Observation: supination: 5/5 wrist ext: 5/5, wrist UD 4+/5, finger ext 3+/5, thumb APL 3+/5  Test measurements:  +Hans; per Hand OT: thumb EPB will be the last to heal- add wrist

## 2018-10-17 ENCOUNTER — HOSPITAL ENCOUNTER (OUTPATIENT)
Dept: PHYSICAL THERAPY | Age: 58
Setting detail: THERAPIES SERIES
Discharge: HOME | End: 2018-10-17
Payer: COMMERCIAL

## 2018-10-17 PROCEDURE — 97012 MECHANICAL TRACTION THERAPY: CPT | Performed by: PHYSICAL THERAPIST

## 2018-10-17 PROCEDURE — G0283 ELEC STIM OTHER THAN WOUND: HCPCS | Performed by: PHYSICAL THERAPIST

## 2018-10-17 PROCEDURE — 97110 THERAPEUTIC EXERCISES: CPT | Performed by: PHYSICAL THERAPIST

## 2018-10-17 PROCEDURE — 97140 MANUAL THERAPY 1/> REGIONS: CPT | Performed by: PHYSICAL THERAPIST

## 2018-10-17 PROCEDURE — 97112 NEUROMUSCULAR REEDUCATION: CPT | Performed by: PHYSICAL THERAPIST

## 2018-10-19 ENCOUNTER — HOSPITAL ENCOUNTER (OUTPATIENT)
Dept: PHYSICAL THERAPY | Age: 58
Setting detail: THERAPIES SERIES
Discharge: HOME | End: 2018-10-19
Payer: COMMERCIAL

## 2018-10-19 PROCEDURE — 97110 THERAPEUTIC EXERCISES: CPT | Performed by: PHYSICAL THERAPIST

## 2018-10-19 PROCEDURE — 97012 MECHANICAL TRACTION THERAPY: CPT | Performed by: PHYSICAL THERAPIST

## 2018-10-19 PROCEDURE — 97140 MANUAL THERAPY 1/> REGIONS: CPT | Performed by: PHYSICAL THERAPIST

## 2018-10-19 PROCEDURE — G0283 ELEC STIM OTHER THAN WOUND: HCPCS | Performed by: PHYSICAL THERAPIST

## 2018-10-19 PROCEDURE — 97112 NEUROMUSCULAR REEDUCATION: CPT | Performed by: PHYSICAL THERAPIST

## 2018-10-19 NOTE — FLOWSHEET NOTE
Therapeutic Ex Sets/sec Reps Notes   UBE      T- band Row/pinch RTB  20 x     T- band lower pinch RTB  20 x  On SB w/ alt LE march   T- band ER activation  (walkaway) RTB H 10 x 10 B     TB tricep GTB  2 x 10    pec (s) on SB 30\" 5                No money RTB on ledge  3 10       Wall snow ovidio w cerv retract H5 2 x 10 reps    T spine mobs: reaching and stepping  10 ea     prone \"I\", \"T\"  H5 15 ea B    Chin tuck H5 10 Supine    Chin tuck w lift      Median/ radial Nn glide at wall  10 reps B With neck drivers as well      Webbing     2 10  *add ext    Manual Intervention           Upper thoracic / CT junction manipulations   CT manip          R shoulder mobs/ median/ ulnar nerve gliding; biceps/ finger flexors CF 10 mins                 NMR re-education      T-spine Ext- foam roll      Chin tucks             Postural education/ unloaded position/ Px, dx, POC, role of PT, radicular sx's and pattern            Traction      Mechanical Traction  30#/ 20#  X 15 minutes 45:15 ratio   Hand therapy/ assessment 8 mins         Therapeutic Exercise and NMR EXR  [] (18685) Provided verbal/tactile cueing for activities related to strengthening, flexibility, endurance, ROM  for improvements in cervical, postural, scapular, scapulothoracic and UE control with self care, reaching, carrying, lifting, house/yardwork, driving/computer work.    [] (51065) Provided verbal/tactile cueing for activities related to improving balance, coordination, kinesthetic sense, posture, motor skill, proprioception  to assist with cervical, scapular, scapulothoracic and UE control with self care, reaching, carrying, lifting, house/yardwork, driving/computer work.     Therapeutic Activities:    [] (42907 or 83191) Provided verbal/tactile cueing for activities related to improving balance, coordination, kinesthetic sense, posture, motor skill, proprioception and motor activation to allow for proper function of cervical, scapular, scapulothoracic

## 2018-10-26 ENCOUNTER — HOSPITAL ENCOUNTER (OUTPATIENT)
Dept: PHYSICAL THERAPY | Age: 58
Setting detail: THERAPIES SERIES
Discharge: HOME | End: 2018-10-26
Payer: COMMERCIAL

## 2018-10-26 PROCEDURE — G0283 ELEC STIM OTHER THAN WOUND: HCPCS | Performed by: PHYSICAL THERAPIST

## 2018-10-26 PROCEDURE — 97012 MECHANICAL TRACTION THERAPY: CPT | Performed by: PHYSICAL THERAPIST

## 2018-10-26 PROCEDURE — 97112 NEUROMUSCULAR REEDUCATION: CPT | Performed by: PHYSICAL THERAPIST

## 2018-10-26 PROCEDURE — 97110 THERAPEUTIC EXERCISES: CPT | Performed by: PHYSICAL THERAPIST

## 2018-10-26 PROCEDURE — 97140 MANUAL THERAPY 1/> REGIONS: CPT | Performed by: PHYSICAL THERAPIST

## 2018-10-26 NOTE — FLOWSHEET NOTE
achieved in: 2 weeks  1. Independent in HEP and progression per patient tolerance, in order to prevent re-injury. 2. Patient will have a decrease in pain to facilitate improvement in movement, function, and ADLs as indicated by Functional Deficits. - MET     Long Term Goals: To be achieved in: 6-8 weeks  1. Disability index score of 10% or less for the Quick DASH to assist with reaching prior level of function. 2. Patient will demonstrate increased AROM to [facility] of cervical/thoracic spine to allow for proper joint functioning as indicated by patients Functional Deficits. 3. Patient will demonstrate an increase in postural awareness and control and activation of  Deep cervical stabilizers to allow for proper functional mobility as indicated by patients Functional Deficits. 4. Patient will return to all functional activities without increased symptoms or restriction. 5. Pt will not eliminated arm sx's with activity. (patient specific functional goal)          Progression Towards Functional goals:  [x] Patient is progressing as expected towards functional goals listed. [] Progression is slowed due to complexities listed. [] Progression has been slowed due to co-morbidities. [] Plan just implemented, too soon to assess goals progression  [] Other:     ASSESSMENT:   Patient demonstrating postural awareness today with exercises. Tolerated postural / scapular exercises well without symptoms. Symptom free following treatment. Tolerated mechanical traction well.       Treatment/Activity Tolerance:  [x] Patient tolerated treatment well [] Patient limited by fatique  [] Patient limited by pain  [] Patient limited by other medical complications  [] Other:     Prognosis: [x] Good [] Fair  [] Poor    Patient Requires Follow-up: [x] Yes  [] No    PLAN: See eval  [x] Continue per plan of care [] Alter current plan (see comments)  [] Plan of care initiated [] Hold pending MD visit [] Discharge    Electronically

## 2018-10-30 ENCOUNTER — APPOINTMENT (OUTPATIENT)
Dept: PHYSICAL THERAPY | Age: 58
End: 2018-10-30
Payer: COMMERCIAL

## 2018-11-01 ENCOUNTER — HOSPITAL ENCOUNTER (OUTPATIENT)
Dept: PHYSICAL THERAPY | Age: 58
Setting detail: THERAPIES SERIES
Discharge: HOME | End: 2018-11-01
Payer: COMMERCIAL

## 2018-11-01 PROCEDURE — G0283 ELEC STIM OTHER THAN WOUND: HCPCS | Performed by: PHYSICAL THERAPIST

## 2018-11-01 PROCEDURE — 97110 THERAPEUTIC EXERCISES: CPT | Performed by: PHYSICAL THERAPIST

## 2018-11-01 PROCEDURE — 97012 MECHANICAL TRACTION THERAPY: CPT | Performed by: PHYSICAL THERAPIST

## 2018-11-01 PROCEDURE — 97112 NEUROMUSCULAR REEDUCATION: CPT | Performed by: PHYSICAL THERAPIST

## 2018-11-01 PROCEDURE — 97140 MANUAL THERAPY 1/> REGIONS: CPT | Performed by: PHYSICAL THERAPIST

## 2018-11-07 ENCOUNTER — HOSPITAL ENCOUNTER (OUTPATIENT)
Dept: PHYSICAL THERAPY | Age: 58
Setting detail: THERAPIES SERIES
Discharge: HOME | End: 2018-11-07
Payer: COMMERCIAL

## 2018-11-07 PROCEDURE — 97110 THERAPEUTIC EXERCISES: CPT | Performed by: PHYSICAL THERAPIST

## 2018-11-07 PROCEDURE — 97140 MANUAL THERAPY 1/> REGIONS: CPT | Performed by: PHYSICAL THERAPIST

## 2018-11-07 PROCEDURE — 97112 NEUROMUSCULAR REEDUCATION: CPT | Performed by: PHYSICAL THERAPIST

## 2018-11-07 PROCEDURE — G0283 ELEC STIM OTHER THAN WOUND: HCPCS | Performed by: PHYSICAL THERAPIST

## 2018-11-07 PROCEDURE — 97012 MECHANICAL TRACTION THERAPY: CPT | Performed by: PHYSICAL THERAPIST

## 2018-11-07 NOTE — FLOWSHEET NOTE
[facility],  [address]  Phone: [phone]  Fax [phone]      Physical Therapy Daily Treatment Note  Date:  2018    Patient Name:  Elroy James    :  1960  MRN: [id number]  Restrictions/Precautions:    Medical/Treatment Diagnosis Information:  Diagnosis: M99.81 (ICD-10-CM) - Foraminal stenosis of cervical region, M50.30 (ICD-10-CM) - DDD (degenerative disc disease), cervical, M54.2 (ICD-10-CM) - Neck pain, R29.898 (ICD-10-CM) - Right hand weakness  Treatment Diagnosis: M54.2 (ICD-10-CM) - Neck pain, R29.898 (ICD-10-CM) - Right hand weakness  Insurance/Certification information:  [facility] 80/20; 30 visits  Physician Information:     Plan of care signed (Y/N):     Date of Patient follow up with Physician: after EMG    G-Code (if applicable):      Date G-Code Applied: 2018        Progress Note: [x]  Yes  []  No  Next due by: Visit #10      Latex Allergy:  [x]NO      []YES  Preferred Language for Healthcare:   [x]English       []other:    Visit # Insurance Allowable Requires auth    30    []no        []yes:     Pain level:  1-2/10     SUBJECTIVE:  Pt notes improvement in hand function- better with holding racquet.  She states that she can tell the hand is improving     OBJECTIVE:   Observation: supination: 5/5 wrist ext: 5/5, wrist UD 4+/5, finger ext 3+/5, thumb APL 3+/5  Test measurements:  +Hans; per Hand OT: thumb EPB will be the last to heal- add wrist ext/ UD  :     RESTRICTIONS/PRECAUTIONS: flexion bias    Exercises/Interventions:   Therapeutic Ex Sets/sec Reps Notes   UBE      T- band Row/pinch RTB  20 x     T- band lower pinch  T band- lat pull BTB   BTB 20 x     T- band ER activation  (walkaway)  TB RTB ER- low  TB 90-90 walkaway- forward/ retro RTB H 10 x 10 B   3 x 10  2 x 10 ea    TB tricep GTB  2 x 10    pec (s) on SB 30\" 5    TB D2 flex (RTB)/ ext (GTB) 2 10 ea     TB clock rounds  TB driving/computer work. Home Exercise Program:    [x] (00261) Reviewed/Progressed HEP activities related to strengthening, flexibility, endurance, ROM of cervical, scapular, scapulothoracic and UE control with self care, reaching, carrying, lifting, house/yardwork, driving/computer work  [] (16949) Reviewed/Progressed HEP activities related to improving balance, coordination, kinesthetic sense, posture, motor skill, proprioception of cervical, scapular, scapulothoracic and UE control with self care, reaching, carrying, lifting, house/yardwork, driving/computer work      Manual Treatments:  PROM / STM / Oscillations-Mobs:  G-I, II, III, IV (PA's, Inf., Post.)  [x] (06059) Provided manual therapy to mobilize soft tissue/joints of cervical/CT, scapular GHJ and UE for the purpose of decreasing headache, modulating pain, promoting relaxation,  increasing ROM, reducing/eliminating soft tissue swelling/inflammation/restriction, improving soft tissue extensibility and allowing for proper ROM for normal function with self care, reaching, carrying, lifting, house/yardwork, driving/computer work    Modalities:  UES/ MHP x 15 mins supine    Charges:  Timed Code Treatment Minutes: 55   Total Treatment Minutes: 80 (modalities)     [] EVAL (LOW) 13859 (typically 20 minutes face-to-face)  [] EVAL (MOD) 70520 (typically 30 minutes face-to-face)  [] EVAL (HIGH) 43673 (typically 45 minutes face-to-face)  [] RE-EVAL     [x] FO(09392) x  2   [] IONTO  [x] NMR (14106) x  1   [] VASO  [x] Manual (11356) x  1    [] Other:  [] TA x       [x] Mech Traction (73915)  [] ES(attended) (49978)      [x] ES (un) (98258):     GOALS:  Patient stated goal: eliminate arm sx's      Therapist goals for Patient:   Short Term Goals: To be achieved in: 2 weeks  1. Independent in HEP and progression per patient tolerance, in order to prevent re-injury.    2. Patient will have a decrease in pain to facilitate improvement in movement, function, and ADLs as

## 2018-11-09 ENCOUNTER — APPOINTMENT (OUTPATIENT)
Dept: PHYSICAL THERAPY | Age: 58
End: 2018-11-09
Payer: COMMERCIAL

## 2018-11-12 ENCOUNTER — HOSPITAL ENCOUNTER (OUTPATIENT)
Dept: PHYSICAL THERAPY | Age: 58
Setting detail: THERAPIES SERIES
Discharge: HOME | End: 2018-11-12
Payer: COMMERCIAL

## 2018-11-12 PROCEDURE — 97140 MANUAL THERAPY 1/> REGIONS: CPT | Performed by: PHYSICAL THERAPIST

## 2018-11-12 PROCEDURE — 97110 THERAPEUTIC EXERCISES: CPT | Performed by: PHYSICAL THERAPIST

## 2018-11-12 PROCEDURE — 97112 NEUROMUSCULAR REEDUCATION: CPT | Performed by: PHYSICAL THERAPIST

## 2018-11-12 PROCEDURE — 97012 MECHANICAL TRACTION THERAPY: CPT | Performed by: PHYSICAL THERAPIST

## 2018-11-12 NOTE — FLOWSHEET NOTE
Disability index score of 10% or less for the Quick DASH to assist with reaching prior level of function. 2. Patient will demonstrate increased AROM to [facility] of cervical/thoracic spine to allow for proper joint functioning as indicated by patients Functional Deficits. 3. Patient will demonstrate an increase in postural awareness and control and activation of  Deep cervical stabilizers to allow for proper functional mobility as indicated by patients Functional Deficits. 4. Patient will return to all functional activities without increased symptoms or restriction. 5. Pt will not eliminated arm sx's with activity. (patient specific functional goal)          Progression Towards Functional goals:  [x] Patient is progressing as expected towards functional goals listed. [] Progression is slowed due to complexities listed. [] Progression has been slowed due to co-morbidities. [] Plan just implemented, too soon to assess goals progression  [] Other:     ASSESSMENT:   Patient demonstrating postural awareness today with exercises. Tolerated postural / scapular exercises well without symptoms. Symptom free following treatment. Tolerated mechanical traction well.     Treatment/Activity Tolerance:  [x] Patient tolerated treatment well [] Patient limited by fatique  [] Patient limited by pain  [] Patient limited by other medical complications  [] Other:     Prognosis: [x] Good [] Fair  [] Poor    Patient Requires Follow-up: [x] Yes  [] No    PLAN: See eval  [x] Continue per plan of care [] Alter current plan (see comments)  [] Plan of care initiated [] Hold pending MD visit [] Discharge    Electronically signed by: Carmelo Streeter

## 2018-11-29 ENCOUNTER — HOSPITAL ENCOUNTER (OUTPATIENT)
Dept: PHYSICAL THERAPY | Age: 58
Setting detail: THERAPIES SERIES
Discharge: HOME | End: 2018-11-29
Payer: COMMERCIAL

## 2018-11-29 PROCEDURE — G8984 CARRY CURRENT STATUS: HCPCS | Performed by: PHYSICAL THERAPIST

## 2018-11-29 PROCEDURE — G8985 CARRY GOAL STATUS: HCPCS | Performed by: PHYSICAL THERAPIST

## 2018-11-29 PROCEDURE — 97110 THERAPEUTIC EXERCISES: CPT | Performed by: PHYSICAL THERAPIST

## 2018-11-29 PROCEDURE — 97112 NEUROMUSCULAR REEDUCATION: CPT | Performed by: PHYSICAL THERAPIST

## 2018-11-29 PROCEDURE — 97140 MANUAL THERAPY 1/> REGIONS: CPT | Performed by: PHYSICAL THERAPIST

## 2018-12-04 ENCOUNTER — HOSPITAL ENCOUNTER (OUTPATIENT)
Dept: PHYSICAL THERAPY | Age: 58
Setting detail: THERAPIES SERIES
Discharge: HOME | End: 2018-12-04
Payer: COMMERCIAL

## 2018-12-04 PROCEDURE — 97110 THERAPEUTIC EXERCISES: CPT | Performed by: PHYSICAL THERAPIST

## 2018-12-04 PROCEDURE — 97140 MANUAL THERAPY 1/> REGIONS: CPT | Performed by: PHYSICAL THERAPIST

## 2018-12-04 PROCEDURE — 97112 NEUROMUSCULAR REEDUCATION: CPT | Performed by: PHYSICAL THERAPIST

## 2018-12-04 NOTE — FLOWSHEET NOTE
improvement in movement, function, and ADLs as indicated by Functional Deficits. - MET     Long Term Goals: To be achieved in: 6-8 weeks  1. Disability index score of 10% or less for the Quick DASH to assist with reaching prior level of function. 2. Patient will demonstrate increased AROM to [facility] of cervical/thoracic spine to allow for proper joint functioning as indicated by patients Functional Deficits. 3. Patient will demonstrate an increase in postural awareness and control and activation of  Deep cervical stabilizers to allow for proper functional mobility as indicated by patients Functional Deficits. 4. Patient will return to all functional activities without increased symptoms or restriction. 5. Pt will not eliminated arm sx's with activity. (patient specific functional goal)          Progression Towards Functional goals:  [x] Patient is progressing as expected towards functional goals listed. [] Progression is slowed due to complexities listed. [] Progression has been slowed due to co-morbidities. [] Plan just implemented, too soon to assess goals progression  [] Other:     ASSESSMENT:   Elbow pain improving- pt instructed to get and use night splint. Assess improvement.     Treatment/Activity Tolerance:  [x] Patient tolerated treatment well [] Patient limited by fatique  [] Patient limited by pain  [] Patient limited by other medical complications  [] Other:     Prognosis: [x] Good [] Fair  [] Poor    Patient Requires Follow-up: [x] Yes  [] No    PLAN: See eval  [x] Continue per plan of care [] Alter current plan (see comments)  [] Plan of care initiated [] Hold pending MD visit [] Discharge    Electronically signed by: Carmelo Raza

## 2018-12-06 ENCOUNTER — HOSPITAL ENCOUNTER (OUTPATIENT)
Dept: PHYSICAL THERAPY | Age: 58
Setting detail: THERAPIES SERIES
Discharge: HOME | End: 2018-12-06
Payer: COMMERCIAL

## 2018-12-06 PROCEDURE — 97110 THERAPEUTIC EXERCISES: CPT | Performed by: PHYSICAL THERAPIST

## 2018-12-06 PROCEDURE — 97112 NEUROMUSCULAR REEDUCATION: CPT | Performed by: PHYSICAL THERAPIST

## 2018-12-06 NOTE — FLOWSHEET NOTE
lap  5 laps    TB D2/ D1 flex (RTB)/ ext (GTB) 2 15 ea B    OVL clock rounds  OVL flexion/ scaption at wall  10 B   x 10    No money RTB on ledge  3 10    SL ER  SL H ABD 2#/ 3 10 ea B  Eccentric added this date   Wall snow ovidio w cerv retract H5 2 x 10 reps    T spine mobs: reaching and stepping  10 ea  +HEP   Prone \"I\", \"T\", \"Y\"  H10 10 ea B + HEP   SL sleeper (s) 30\" 5    Ball taps at wall  8 rounds Multiple drops    Chin tuck w lift H5 10 +HEP   Median/ radial Nn glide at wall  10 reps B With neck drivers  +HEP   Wrist progressive wrist ext w/ ecc. 2# 10 reps ea +HEP   Webbing      Wrist ext with eccentric  Wrist ext +UD 3 10  *add ext  2# 3 x 10     Difficulty noted    Manual Intervention           Upper thoracic / CT junction manipulations                Wrist stretching after         NMR re-education                            Traction              Therapeutic Exercise and NMR EXR  [] (44683) Provided verbal/tactile cueing for activities related to strengthening, flexibility, endurance, ROM  for improvements in cervical, postural, scapular, scapulothoracic and UE control with self care, reaching, carrying, lifting, house/yardwork, driving/computer work.    [] (36838) Provided verbal/tactile cueing for activities related to improving balance, coordination, kinesthetic sense, posture, motor skill, proprioception  to assist with cervical, scapular, scapulothoracic and UE control with self care, reaching, carrying, lifting, house/yardwork, driving/computer work. Therapeutic Activities:    [] (47633 or 84295) Provided verbal/tactile cueing for activities related to improving balance, coordination, kinesthetic sense, posture, motor skill, proprioception and motor activation to allow for proper function of cervical, scapular, scapulothoracic and UE control with self care, carrying, lifting, driving/computer work.      Home Exercise Program:    [x] (65717) Reviewed/Progressed HEP activities related to

## 2018-12-11 ENCOUNTER — HOSPITAL ENCOUNTER (OUTPATIENT)
Dept: PHYSICAL THERAPY | Age: 58
Setting detail: THERAPIES SERIES
Discharge: HOME | End: 2018-12-11
Payer: COMMERCIAL

## 2018-12-11 PROCEDURE — 97110 THERAPEUTIC EXERCISES: CPT | Performed by: PHYSICAL THERAPIST

## 2018-12-11 PROCEDURE — 97112 NEUROMUSCULAR REEDUCATION: CPT | Performed by: PHYSICAL THERAPIST

## 2018-12-11 PROCEDURE — 97140 MANUAL THERAPY 1/> REGIONS: CPT | Performed by: PHYSICAL THERAPIST

## 2018-12-13 ENCOUNTER — HOSPITAL ENCOUNTER (OUTPATIENT)
Dept: PHYSICAL THERAPY | Age: 58
Setting detail: THERAPIES SERIES
Discharge: HOME | End: 2018-12-13
Payer: COMMERCIAL

## 2018-12-13 PROCEDURE — 97112 NEUROMUSCULAR REEDUCATION: CPT | Performed by: PHYSICAL THERAPIST

## 2018-12-13 PROCEDURE — 97110 THERAPEUTIC EXERCISES: CPT | Performed by: PHYSICAL THERAPIST

## 2018-12-13 NOTE — FLOWSHEET NOTE
[facility],  [address] [place]  Phone: [phone]  Fax [phone]      Physical Therapy Daily Treatment Note  Date:  2018    Patient Name:  Owen Wick    :  1960  MRN: [id number]  Restrictions/Precautions:    Medical/Treatment Diagnosis Information:  Diagnosis: M99.81 (ICD-10-CM) - Foraminal stenosis of cervical region, M50.30 (ICD-10-CM) - DDD (degenerative disc disease), cervical, M54.2 (ICD-10-CM) - Neck pain, R29.898 (ICD-10-CM) - Right hand weakness  Treatment Diagnosis: M54.2 (ICD-10-CM) - Neck pain, R29.898 (ICD-10-CM) - Right hand weakness  Insurance/Certification information:  [facility] 80/20; 30 visits  Physician Information:     Plan of care signed (Y/N):     Date of Patient follow up with Physician: after EMG    G-Code (if applicable):      Date G-Code Applied: 2018        Progress Note: [x]  Yes  []  No  Next due by: Visit #10      Latex Allergy:  [x]NO      []YES  Preferred Language for Healthcare:   [x]English       []other:    Visit # Insurance Allowable Requires auth       []no        []yes:     Pain level:  0/10     SUBJECTIVE:      OBJECTIVE:   Observation: supination: 5/5 wrist ext: 5/5, wrist UD 4+/5, finger ext 3+/5, thumb APL 3+/5  Test measurements:  +Hans; per Hand OT: thumb EPB will be the last to heal- add wrist ext/ UD  :      RESTRICTIONS/PRECAUTIONS: flexion bias    Exercises/Interventions:   Therapeutic Ex Sets/sec Reps Notes   UBE      Body blade- abd/add, flex in neutral 20\" 3 ea     Wall ball x 4 dir.  30  B     T- band Row/pinch RTB  20 x     T- band lower pinch  T band- lat pull BTB   BTB 20 x     T- band H ABD  TB RTB ER- low  TB 90-90  RTB 2 x 10 B   2 x 10 B  3 x 10 R    SB push up 3 10    OVL pull backs at wall  OVL lateral lap  OVL flexion walk 2 10 reps B  2 lap  5 laps    TB D2/ D1 flex (RTB)/ ext (GTB) 2 15 ea B    OVL clock rounds  OVL flexion/ scaption

## 2018-12-13 NOTE — FLOWSHEET NOTE
[facility],  [address] [place]  Phone: [phone]  Fax [phone]    ATHLETIC TRAINING [address]  Date:  2018    Patient Name:  Tali Draper    :  1960  MRN: [id number]  Restrictions/Precautions:    Medical/Treatment Diagnosis Information:  ·  Dx: cervical foraminal stenosis; cervical DDD  ·  Tx Dx: neck pn, R hand weakness  Physician Information:   Shaun Spears Post-op  2wks    4 wks 6 wks 8 wks   3wks 6 wks 9 wks 12 wks                        Activity Log                                                          DOS/DOI:                                                         Date: 10/10/18 10/19/18 12/13/18   ATC Communication: tennis  Neurogenic weakness in R shld to hand  Concommit L tennis elbow   Pt states improvement last couple of months. Use hook wrist braces from  prn for elbow pain  Began to have cramping and soreness in forearms, so we stopped         Plyoback      Chop                           Chest                           ER Flip            Long/Short lever  Flex. Scap.                                   ABd.  GMB wall clocks 10x           punch OVL 8\" box 10x3\" w/PT          Body/Cardio Blade Flex/Ext                                      IR/ER                                      ABd/ADd            Push-up      Plus                             Wall                           Table                          Floor            Ball on Wall  GMB 4-way 2x10                Tramp            Theraband    Rows GTB 3x10                           Ext GTB 3x10 w/PT                          IR  RTB 2x10                          ER  RTB 2x10                          No money GTB 3x10                           Horiz.  ABd                            Biceps                            Triceps                            Punches PNF D1/D2  RTB R/L flex 15x ea GTB B arms pull upward R/L 15x ea                         Trunk Rot   GTB B arms straight R/L 15x ea         Cable Column   Rows   20# (R/L) rxx,lxx 10x ea                              Ext.   20# (B) 2x10                              Lat. Pulldown   30# (B) 2x10                              Biceps                                 Triceps            Modality ES/HP 15' ES/HP 15' declined   Initials DTM JLW JLW   Time spent with PT assistant

## 2018-12-18 ENCOUNTER — HOSPITAL ENCOUNTER (OUTPATIENT)
Dept: PHYSICAL THERAPY | Age: 58
Setting detail: THERAPIES SERIES
Discharge: HOME | End: 2018-12-18
Payer: COMMERCIAL

## 2018-12-18 PROCEDURE — 97012 MECHANICAL TRACTION THERAPY: CPT | Performed by: PHYSICAL THERAPIST

## 2018-12-18 PROCEDURE — 97140 MANUAL THERAPY 1/> REGIONS: CPT | Performed by: PHYSICAL THERAPIST

## 2018-12-18 PROCEDURE — 97112 NEUROMUSCULAR REEDUCATION: CPT | Performed by: PHYSICAL THERAPIST

## 2018-12-18 PROCEDURE — 97110 THERAPEUTIC EXERCISES: CPT | Performed by: PHYSICAL THERAPIST

## 2018-12-20 ENCOUNTER — HOSPITAL ENCOUNTER (OUTPATIENT)
Dept: PHYSICAL THERAPY | Age: 58
Setting detail: THERAPIES SERIES
Discharge: HOME | End: 2018-12-20
Payer: COMMERCIAL

## 2018-12-27 ENCOUNTER — HOSPITAL ENCOUNTER (OUTPATIENT)
Dept: PHYSICAL THERAPY | Age: 58
Setting detail: THERAPIES SERIES
Discharge: HOME | End: 2018-12-27
Payer: COMMERCIAL

## 2018-12-27 PROCEDURE — G8986 CARRY D/C STATUS: HCPCS | Performed by: PHYSICAL THERAPIST

## 2018-12-27 PROCEDURE — G8985 CARRY GOAL STATUS: HCPCS | Performed by: PHYSICAL THERAPIST

## 2018-12-27 PROCEDURE — G8984 CARRY CURRENT STATUS: HCPCS | Performed by: PHYSICAL THERAPIST

## 2018-12-27 PROCEDURE — 97110 THERAPEUTIC EXERCISES: CPT | Performed by: PHYSICAL THERAPIST

## 2018-12-27 PROCEDURE — 97112 NEUROMUSCULAR REEDUCATION: CPT | Performed by: PHYSICAL THERAPIST

## 2018-12-27 NOTE — FLOWSHEET NOTE
improving balance, coordination, kinesthetic sense, posture, motor skill, proprioception of cervical, scapular, scapulothoracic and UE control with self care, reaching, carrying, lifting, house/yardwork, driving/computer work      Manual Treatments:  PROM / STM / Oscillations-Mobs:  G-I, II, III, IV (PA's, Inf., Post.)  [x] (87815) Provided manual therapy to mobilize soft tissue/joints of cervical/CT, scapular GHJ and UE for the purpose of decreasing headache, modulating pain, promoting relaxation,  increasing ROM, reducing/eliminating soft tissue swelling/inflammation/restriction, improving soft tissue extensibility and allowing for proper ROM for normal function with self care, reaching, carrying, lifting, house/yardwork, driving/computer work    Modalities:   declined d/t time    Charges:  Timed Code Treatment Minutes: 40   Total Treatment Minutes: 40     [] EVAL (LOW) 97001 (typically 20 minutes face-to-face)  [] EVAL (MOD) 72549 (typically 30 minutes face-to-face)  [] EVAL (HIGH) 46593 (typically 45 minutes face-to-face)  [] RE-EVAL     [x] MH(27304) x  1   [] IONTO  [x] NMR (29351) x  2   [] VASO  [x] Manual ([id number]) x       [] Other:  [] TA x       [] Mech Traction (29397)  [] ES(attended) (81749)      [] ES (un) (10499):     GOALS:  Patient stated goal: eliminate arm sx's      Therapist goals for Patient:   Short Term Goals: To be achieved in: 2 weeks  1. Independent in HEP and progression per patient tolerance, in order to prevent re-injury. 2. Patient will have a decrease in pain to facilitate improvement in movement, function, and ADLs as indicated by Functional Deficits. - MET     Long Term Goals: To be achieved in: 6-8 weeks  1. Disability index score of 10% or less for the Quick DASH to assist with reaching prior level of function. 2. Patient will demonstrate increased AROM to [facility] of cervical/thoracic spine to allow for proper joint functioning as indicated by patients Functional Deficits.    3.

## 2020-11-04 ENCOUNTER — APPOINTMENT (OUTPATIENT)
Dept: GENERAL RADIOLOGY | Age: 60
End: 2020-11-04
Payer: COMMERCIAL

## 2020-11-04 ENCOUNTER — HOSPITAL ENCOUNTER (EMERGENCY)
Age: 60
Discharge: HOME | End: 2020-11-04
Attending: EMERGENCY MEDICINE
Payer: COMMERCIAL

## 2020-11-04 VITALS
SYSTOLIC BLOOD PRESSURE: 128 MMHG | HEART RATE: 66 BPM | WEIGHT: 137 LBS | RESPIRATION RATE: 16 BRPM | TEMPERATURE: 98.3 F | HEIGHT: 66 IN | OXYGEN SATURATION: 100 % | DIASTOLIC BLOOD PRESSURE: 75 MMHG | BODY MASS INDEX: 22.02 KG/M2

## 2020-11-04 PROCEDURE — 99283 EMERGENCY DEPT VISIT LOW MDM: CPT

## 2020-11-04 PROCEDURE — 73140 X-RAY EXAM OF FINGER(S): CPT

## 2020-11-04 RX ORDER — HYDROCODONE BITARTRATE AND ACETAMINOPHEN 5; 325 MG/1; MG/1
1-2 TABLET ORAL EVERY 6 HOURS PRN
Qty: 12 TABLET | Refills: 0 | Status: SHIPPED | OUTPATIENT
Start: 2020-11-04 | End: 2020-11-07

## 2020-11-04 ASSESSMENT — ENCOUNTER SYMPTOMS
COLOR CHANGE: 1
EYES NEGATIVE: 1
RESPIRATORY NEGATIVE: 1

## 2020-11-04 ASSESSMENT — PAIN SCALES - GENERAL: PAINLEVEL_OUTOF10: 2

## 2020-11-04 NOTE — ED PROVIDER NOTES
Sherry Salazar is a 61year old right hand dominant female who injured her left ring finger yesterday while playing tennis. She was holding the racquet in her right hand addressing the neg. She had her left hand on the frame of the racquet when the opponent hit the ball forcefully and it struck her left ring finger. She has pain, swelling, and ecchymosis of the dorsum of the left fourth finger, and inability to extend the DIP joint. She denies any loss of sensation. No other injuries reported. No previous injury to the left hand. Analgesia offered, but patient declined. /75   Pulse 66   Temp 98.3 °F (36.8 °C) (Oral)   Resp 16   Ht 5' 6\" (1.676 m)   Wt 137 lb (62.1 kg)   SpO2 100%   BMI 22.11 kg/m²     I have reviewed the following from the nursing documentation:      Prior to Admission medications    Medication Sig Start Date End Date Taking? Authorizing Provider   methylPREDNISolone (MEDROL, BRANDON,) 4 MG tablet TAKE AS DIRECTED ON PACKAGE INSERT 9/13/18   Violetta Quinteros MD   gabapentin (NEURONTIN) 300 MG capsule 1 po  qhs 11/10/17   Mary Hernandez MD       Allergies as of 11/04/2020    (No Known Allergies)       Past Medical History:   Diagnosis Date    H/O Graves' disease         Surgical History: History reviewed. No pertinent surgical history.      Family History:    Family History   Problem Relation Age of Onset    Cancer Mother        Social History     Socioeconomic History    Marital status:      Spouse name: Not on file    Number of children: Not on file    Years of education: Not on file    Highest education level: Not on file   Occupational History    Not on file   Social Needs    Financial resource strain: Not on file    Food insecurity     Worry: Not on file     Inability: Not on file    Transportation needs     Medical: Not on file     Non-medical: Not on file   Tobacco Use    Smoking status: Former Smoker     Types: Cigarettes     Last attempt to quit: 1/1/2013 Years since quittin.8    Smokeless tobacco: Never Used   Substance and Sexual Activity    Alcohol use: Yes    Drug use: No    Sexual activity: Not on file   Lifestyle    Physical activity     Days per week: Not on file     Minutes per session: Not on file    Stress: Not on file   Relationships    Social connections     Talks on phone: Not on file     Gets together: Not on file     Attends Christian service: Not on file     Active member of club or organization: Not on file     Attends meetings of clubs or organizations: Not on file     Relationship status: Not on file    Intimate partner violence     Fear of current or ex partner: Not on file     Emotionally abused: Not on file     Physically abused: Not on file     Forced sexual activity: Not on file   Other Topics Concern    Not on file   Social History Narrative    Not on file       Review of Systems   Constitutional: Negative for activity change, appetite change, chills and fever. HENT: Negative. Eyes: Negative. Respiratory: Negative. Cardiovascular: Negative. Musculoskeletal: Positive for arthralgias (left ring finger DIP joint). Skin: Positive for color change (bruising left ring finger. ). Neurological: Negative for weakness and numbness. Hematological: Negative for adenopathy. Does not bruise/bleed easily. Psychiatric/Behavioral: Negative for agitation and behavioral problems. Physical Exam  Constitutional:       Appearance: Normal appearance. HENT:      Head: Normocephalic and atraumatic. Eyes:      Pupils: Pupils are equal, round, and reactive to light. Pulmonary:      Effort: Pulmonary effort is normal.   Musculoskeletal:         General: Swelling present. Comments: The dorsum of the left ring finger is ecchymotic along the entire length of the proximal, middle and distal phalynx. She has swelling and flexion of the DIP and is unable to extend it actively.  No sensory or motor deficit appreciated otherwise. Skin is intact. Skin:     General: Skin is warm and dry. Capillary Refill: Capillary refill takes less than 2 seconds. Findings: Bruising present. Neurological:      Mental Status: She is alert. Psychiatric:         Mood and Affect: Mood normal.         Behavior: Behavior normal.          Procedures     MDM   No results found for this visit on 11/04/20. I estimate there is LOW risk for COMPARTMENT SYNDROME, DEEP VENOUS THROMBOSIS, SEPTIC ARTHRITIS, TENDON OR NEUROVASCULAR INJURY, thus I consider the discharge disposition reasonable. Raven Olivo and I have discussed the diagnosis and risks, and we agree with discharging home to follow-up with their primary doctor or the referral orthopedist. We also discussed returning to the Emergency Department immediately if new or worsening symptoms occur. We have discussed the symptoms which are most concerning (e.g., changing or worsening pain, numbness, weakness) that necessitate immediate return. Final Impression    1. Closed avulsion fracture of distal phalanx of finger, initial encounter        Blood pressure 128/75, pulse 66, temperature 98.3 °F (36.8 °C), temperature source Oral, resp. rate 16, height 5' 6\" (1.676 m), weight 137 lb (62.1 kg), SpO2 100 %, not currently breastfeeding.     Radiology  Xr Finger Left (min 2 Views)    Result Date: 11/4/2020  Chip or avulsion fracture involving the dorsal base of the 4th distal phalanx            Sohail Medina MD  11/04/20 [id number]

## 2020-11-04 NOTE — ED NOTES
Discharge paperwork given to and reviewed with pt. Pt verbalized understanding and all questions answered. Pt encouraged to return if having worsening symptoms or new symptoms discussed in discharge paperwork. Pt to follow up with Ortho  Rx x 1 given and medications reviewed with pt. Pt in NAD, RR even and unlabored.  Pt off unit ambulatory with spouse     Terrell Beckman, [facility]  11/04/20 [id number]

## 2020-11-04 NOTE — ED NOTES
Finger splint applied to L ring finger which extends down to palm preventing flexion of MCP joint. Pt tolerated well.      [facility]  11/04/20 9457

## 2020-11-05 ENCOUNTER — OFFICE VISIT (OUTPATIENT)
Dept: ORTHOPEDIC SURGERY | Age: 60
End: 2020-11-05
Payer: COMMERCIAL

## 2020-11-05 VITALS — BODY MASS INDEX: 22.02 KG/M2 | WEIGHT: 137 LBS | RESPIRATION RATE: 16 BRPM | HEIGHT: 66 IN

## 2020-11-05 PROBLEM — S62.635A CLOSED DISPLACED FRACTURE OF DISTAL PHALANX OF LEFT RING FINGER: Status: ACTIVE | Noted: 2020-11-05

## 2020-11-05 PROBLEM — M20.012 MALLET FINGER OF LEFT FINGER(S): Status: ACTIVE | Noted: 2020-11-05

## 2020-11-05 PROCEDURE — 3017F COLORECTAL CA SCREEN DOC REV: CPT | Performed by: ORTHOPAEDIC SURGERY

## 2020-11-05 PROCEDURE — 29130 APPL FINGER SPLINT STATIC: CPT | Performed by: ORTHOPAEDIC SURGERY

## 2020-11-05 PROCEDURE — 99213 OFFICE O/P EST LOW 20 MIN: CPT | Performed by: ORTHOPAEDIC SURGERY

## 2020-11-05 PROCEDURE — G8420 CALC BMI NORM PARAMETERS: HCPCS | Performed by: ORTHOPAEDIC SURGERY

## 2020-11-05 PROCEDURE — G8484 FLU IMMUNIZE NO ADMIN: HCPCS | Performed by: ORTHOPAEDIC SURGERY

## 2020-11-05 PROCEDURE — 1036F TOBACCO NON-USER: CPT | Performed by: ORTHOPAEDIC SURGERY

## 2020-11-05 PROCEDURE — G8427 DOCREV CUR MEDS BY ELIG CLIN: HCPCS | Performed by: ORTHOPAEDIC SURGERY

## 2020-11-05 NOTE — PROGRESS NOTES
Chief Complaint  Finger Pain (NP LT RING FINGER)      History of Present Illness:  Tangela Chen is a 61 y.o. female. She presents today for a new hand surgery specialty evaluation regarding her left ring finger. She reports that she was playing tennis on 11/3/2020 when a ball hit her on the left ring finger. She went to the emergency room after she had significant bruising and swelling and she was diagnosed with a mallet fracture. She was then placed into a very large AlumaFoam splint. She plays tennis one-handed and rarely uses a two-handed backhand. Medical History  Patient's medications, allergies, past medical, surgical, social and family histories were reviewed and updated as appropriate. Review of Systems  Pertinent items are noted in HPI  Denies fever, chills, confusion, bowel/bladder active change. Review of systems reviewed from Patient History Form dated on 11/5/20 and available in the patient's chart under the Media tab. Vital Signs  Vitals:    11/05/20 1329   Resp: 16       General Exam:   Constitutional: Patient is adequately groomed with no evidence of malnutrition  Mental Status: The patient is oriented to time, place and person. The patient's mood and affect are appropriate. Lymphatic: The lymphatic examination bilaterally reveals all areas to be without enlargement or induration. Neurological: The patient has good coordination. There is no weakness or sensory deficit. Finger Examination  Inspection: There is bruising with mild fullness over the DIP dorsally of the left ring finger and a slight extensor lag    Palpation: Mild tenderness if I palpate firmly over the dorsal capsule of the DIP joint    Range of Motion: The patient lacks the last 15 degrees of active DIP extension    Strength: Normal except for terminal extensor tendon integrity    Special Tests: DIP joint remains stable    Skin: There are no additional worrisome rashes, ulcerations or lesions.     Gait:

## 2020-11-19 ENCOUNTER — OFFICE VISIT (OUTPATIENT)
Dept: ORTHOPEDIC SURGERY | Age: 60
End: 2020-11-19
Payer: COMMERCIAL

## 2020-11-19 VITALS — RESPIRATION RATE: 17 BRPM | HEIGHT: 66 IN | WEIGHT: 137 LBS | BODY MASS INDEX: 22.02 KG/M2

## 2020-11-19 PROCEDURE — 3017F COLORECTAL CA SCREEN DOC REV: CPT | Performed by: ORTHOPAEDIC SURGERY

## 2020-11-19 PROCEDURE — G8420 CALC BMI NORM PARAMETERS: HCPCS | Performed by: ORTHOPAEDIC SURGERY

## 2020-11-19 PROCEDURE — 99213 OFFICE O/P EST LOW 20 MIN: CPT | Performed by: ORTHOPAEDIC SURGERY

## 2020-11-19 PROCEDURE — G8427 DOCREV CUR MEDS BY ELIG CLIN: HCPCS | Performed by: ORTHOPAEDIC SURGERY

## 2020-11-19 PROCEDURE — 1036F TOBACCO NON-USER: CPT | Performed by: ORTHOPAEDIC SURGERY

## 2020-11-19 PROCEDURE — G8484 FLU IMMUNIZE NO ADMIN: HCPCS | Performed by: ORTHOPAEDIC SURGERY

## 2020-11-19 NOTE — PROGRESS NOTES
Chief Complaint:  Finger Pain (CK LT RING MALLET FINGER FROM 11/3. NEW XR TODAY)      History of Present of Illness: The patient returns and reports that she has been doing full-time mallet finger splinting since the injury playing tennis. She is now approximately 2 weeks out from the mallet injury. Review of Systems  Pertinent items are noted in HPI  Denies fever, chills, confusion, bowel/bladder active change. Review of systems reviewed from Patient History Form dated on 11/5/2020 and available in the patient's chart under the Media tab. Examination:  On exam today the skin remains intact but she has a fair amount of tenderness still at the PIP level. With her splint held together with Coban she does not have a position to achieve full DIP extension. There is good perfusion and good sensation. There is some mild dorsal prominence and tenderness at the DIP level. Radiology:     X-rays obtained and reviewed in office:  Views 3  Location left ring finger  Impression x-rays demonstrate the mallet fracture but no sign of subluxation    Orders Placed This Encounter   Procedures    XR FINGER LEFT (MIN 2 VIEWS)     Standing Status:   Future     Number of Occurrences:   1     Standing Expiration Date:   11/19/2021       Impression:  Encounter Diagnoses   Name Primary?  Finger pain, left Yes    Closed displaced fracture of distal phalanx of left ring finger with routine healing, subsequent encounter     Mallet finger of left finger(s)          Treatment Plan:  I believe she can continue with conservative care but we will make some slight adjustments to better secure the mallet finger splinting and full DIP extension. I also want to make certain that she is mobilizing at the PIP level. Today I placed another AlumaFoam splint dorsally and used a heavier type of tape to better secure for full extension positioning.     Unless there are significant setbacks I will simply see her in 4 weeks with repeat x-rays and then likely transition to part-time splinting. Please note that this transcription was created using voice recognition software. Any errors are unintentional and may be due to voice recognition transcription.

## 2020-12-17 ENCOUNTER — OFFICE VISIT (OUTPATIENT)
Dept: ORTHOPEDIC SURGERY | Age: 60
End: 2020-12-17
Payer: COMMERCIAL

## 2020-12-17 VITALS — WEIGHT: 137 LBS | HEIGHT: 66 IN | BODY MASS INDEX: 22.02 KG/M2

## 2020-12-17 PROCEDURE — 3017F COLORECTAL CA SCREEN DOC REV: CPT | Performed by: ORTHOPAEDIC SURGERY

## 2020-12-17 PROCEDURE — G8484 FLU IMMUNIZE NO ADMIN: HCPCS | Performed by: ORTHOPAEDIC SURGERY

## 2020-12-17 PROCEDURE — G8427 DOCREV CUR MEDS BY ELIG CLIN: HCPCS | Performed by: ORTHOPAEDIC SURGERY

## 2020-12-17 PROCEDURE — 1036F TOBACCO NON-USER: CPT | Performed by: ORTHOPAEDIC SURGERY

## 2020-12-17 PROCEDURE — 99213 OFFICE O/P EST LOW 20 MIN: CPT | Performed by: ORTHOPAEDIC SURGERY

## 2020-12-17 PROCEDURE — G8420 CALC BMI NORM PARAMETERS: HCPCS | Performed by: ORTHOPAEDIC SURGERY

## 2021-01-07 ENCOUNTER — OFFICE VISIT (OUTPATIENT)
Dept: ORTHOPEDIC SURGERY | Age: 61
End: 2021-01-07
Payer: COMMERCIAL

## 2021-01-07 DIAGNOSIS — M20.012 MALLET FINGER OF LEFT FINGER(S): ICD-10-CM

## 2021-01-07 DIAGNOSIS — S62.635D CLOSED DISPLACED FRACTURE OF DISTAL PHALANX OF LEFT RING FINGER WITH ROUTINE HEALING, SUBSEQUENT ENCOUNTER: Primary | ICD-10-CM

## 2021-01-07 PROCEDURE — G8420 CALC BMI NORM PARAMETERS: HCPCS | Performed by: ORTHOPAEDIC SURGERY

## 2021-01-07 PROCEDURE — G8484 FLU IMMUNIZE NO ADMIN: HCPCS | Performed by: ORTHOPAEDIC SURGERY

## 2021-01-07 PROCEDURE — 99213 OFFICE O/P EST LOW 20 MIN: CPT | Performed by: ORTHOPAEDIC SURGERY

## 2021-01-07 PROCEDURE — 1036F TOBACCO NON-USER: CPT | Performed by: ORTHOPAEDIC SURGERY

## 2021-01-07 PROCEDURE — G8427 DOCREV CUR MEDS BY ELIG CLIN: HCPCS | Performed by: ORTHOPAEDIC SURGERY

## 2021-01-07 PROCEDURE — 3017F COLORECTAL CA SCREEN DOC REV: CPT | Performed by: ORTHOPAEDIC SURGERY

## 2021-01-07 NOTE — PROGRESS NOTES
Chief Complaint:  Finger Pain (CK LT RING FINGER MALLET FROM 11/3)      History of Present of Illness: Patient returns and reports that even with routine mallet finger splinting and transition away from splinting she has been having significant issues with stiffness and tenderness not only in the ring finger but now also the adjacent long and small fingers. Review of Systems  Pertinent items are noted in HPI  Denies fever, chills, confusion, bowel/bladder active change. Review of systems reviewed from Patient History Form dated on 11/5/2020 and available in the patient's chart under the Media tab. Examination:  Fingers are well perfused and sensate and there is good stability. She does have a residual extensor lag of probably 15 degrees at the DIP of the ring finger. However, she has quite global stiffness not just at the ring finger DIP but also the long ring and small finger PIPs. She is quite tender with even gentle passive stretch. Radiology:     X-rays obtained and reviewed in office:  Views    Location    Impression      No orders of the defined types were placed in this encounter. Impression:  Encounter Diagnoses   Name Primary?  Closed displaced fracture of distal phalanx of left ring finger with routine healing, subsequent encounter Yes    Mallet finger of left finger(s)          Treatment Plan:  I believe the patient has some posttraumatic stiffness as well as a slight residual droop. She does understand that even with appropriate and highly functioning healing, and extensor lag can be anticipated. I believe her main issue now is the posttraumatic stiffness and we will start working with a hand therapy team to mobilize her further. I will plan to see her back on an as-needed basis moving forward. Please note that this transcription was created using voice recognition software. Any errors are unintentional and may be due to voice recognition transcription.

## 2021-01-15 ENCOUNTER — TELEPHONE (OUTPATIENT)
Dept: ORTHOPEDIC SURGERY | Age: 61
End: 2021-01-15

## 2021-01-15 NOTE — TELEPHONE ENCOUNTER
Levi Councilman called requesting a script for physical therapy     Phone [phone]  Fax [phone]

## 2021-01-18 ENCOUNTER — TELEPHONE (OUTPATIENT)
Dept: ORTHOPEDIC SURGERY | Age: 61
End: 2021-01-18

## 2021-01-20 ENCOUNTER — TELEPHONE (OUTPATIENT)
Dept: ORTHOPEDIC SURGERY | Age: 61
End: 2021-01-20

## 2021-01-20 DIAGNOSIS — S62.635D CLOSED DISPLACED FRACTURE OF DISTAL PHALANX OF LEFT RING FINGER WITH ROUTINE HEALING, SUBSEQUENT ENCOUNTER: ICD-10-CM

## 2021-01-20 DIAGNOSIS — M79.645 FINGER PAIN, LEFT: ICD-10-CM

## 2021-01-20 DIAGNOSIS — M20.012 MALLET FINGER OF LEFT FINGER(S): Primary | ICD-10-CM

## 2021-12-09 ENCOUNTER — OFFICE VISIT (OUTPATIENT)
Dept: INTERNAL MEDICINE CLINIC | Age: 61
End: 2021-12-09
Payer: COMMERCIAL

## 2021-12-09 VITALS
BODY MASS INDEX: 22.44 KG/M2 | SYSTOLIC BLOOD PRESSURE: 110 MMHG | OXYGEN SATURATION: 99 % | HEART RATE: 69 BPM | WEIGHT: 139 LBS | TEMPERATURE: 97.2 F | DIASTOLIC BLOOD PRESSURE: 60 MMHG

## 2021-12-09 DIAGNOSIS — Z00.00 ROUTINE GENERAL MEDICAL EXAMINATION AT A HEALTH CARE FACILITY: Primary | ICD-10-CM

## 2021-12-09 DIAGNOSIS — Z12.11 SCREEN FOR COLON CANCER: ICD-10-CM

## 2021-12-09 DIAGNOSIS — Z78.0 ASYMPTOMATIC POSTMENOPAUSAL STATUS: ICD-10-CM

## 2021-12-09 DIAGNOSIS — Z13.6 SCREENING FOR CARDIOVASCULAR CONDITION: ICD-10-CM

## 2021-12-09 DIAGNOSIS — Z13.0 SCREENING FOR DEFICIENCY ANEMIA: ICD-10-CM

## 2021-12-09 DIAGNOSIS — Z12.31 SCREENING MAMMOGRAM FOR BREAST CANCER: ICD-10-CM

## 2021-12-09 DIAGNOSIS — Z01.419 CERVICAL SMEAR, AS PART OF ROUTINE GYNECOLOGICAL EXAMINATION: ICD-10-CM

## 2021-12-09 PROCEDURE — 99386 PREV VISIT NEW AGE 40-64: CPT | Performed by: FAMILY MEDICINE

## 2021-12-09 PROCEDURE — G8484 FLU IMMUNIZE NO ADMIN: HCPCS | Performed by: FAMILY MEDICINE

## 2021-12-09 SDOH — ECONOMIC STABILITY: FOOD INSECURITY: WITHIN THE PAST 12 MONTHS, THE FOOD YOU BOUGHT JUST DIDN'T LAST AND YOU DIDN'T HAVE MONEY TO GET MORE.: NEVER TRUE

## 2021-12-09 SDOH — ECONOMIC STABILITY: FOOD INSECURITY: WITHIN THE PAST 12 MONTHS, YOU WORRIED THAT YOUR FOOD WOULD RUN OUT BEFORE YOU GOT MONEY TO BUY MORE.: NEVER TRUE

## 2021-12-09 ASSESSMENT — SOCIAL DETERMINANTS OF HEALTH (SDOH): HOW HARD IS IT FOR YOU TO PAY FOR THE VERY BASICS LIKE FOOD, HOUSING, MEDICAL CARE, AND HEATING?: NOT HARD AT ALL

## 2021-12-09 ASSESSMENT — PATIENT HEALTH QUESTIONNAIRE - PHQ9
SUM OF ALL RESPONSES TO PHQ QUESTIONS 1-9: 0
SUM OF ALL RESPONSES TO PHQ9 QUESTIONS 1 & 2: 0
SUM OF ALL RESPONSES TO PHQ QUESTIONS 1-9: 0
SUM OF ALL RESPONSES TO PHQ QUESTIONS 1-9: 0
2. FEELING DOWN, DEPRESSED OR HOPELESS: 0
1. LITTLE INTEREST OR PLEASURE IN DOING THINGS: 0

## 2021-12-09 NOTE — PROGRESS NOTES
History and Physical      [facility]  [facility] 1960    Date of Service:  2021    Chief Complaint:   Ac Oviedo is a 64 y.o. female who presents for complete physical examination. HPI: Doing well. Has not been here in about 4 years. No Rx meds, but does take Zinc, D3 2000 IU, Reservatrol, vit C with rosehips, and a MVI qd. Wt Readings from Last 3 Encounters:   21 139 lb (63 kg)   20 137 lb (62.1 kg)   20 137 lb (62.1 kg)     BP Readings from Last 3 Encounters:   21 110/60   20 128/75   18 121/67       Patient Active Problem List   Diagnosis    H/O Graves' disease    Biceps tendinitis of right upper extremity    Weakness of right hand    Posterior interosseous nerve injury, right, sequela    Mallet finger of left finger(s)    Closed displaced fracture of distal phalanx of left ring finger       No Known Allergies  No outpatient medications have been marked as taking for the 21 encounter (Office Visit) with Rashmi Carmona MD.       Past Medical History:   Diagnosis Date    H/O Graves' disease      History reviewed. No pertinent surgical history. Family History   Problem Relation Age of Onset    Cancer Mother      Social History     Socioeconomic History    Marital status:      Spouse name: Not on file    Number of children: Not on file    Years of education: Not on file    Highest education level: Not on file   Occupational History    Not on file   Tobacco Use    Smoking status: Former Smoker     Types: Cigarettes     Quit date: 2013     Years since quittin.9    Smokeless tobacco: Never Used   Substance and Sexual Activity    Alcohol use:  Yes    Drug use: No    Sexual activity: Not on file   Other Topics Concern    Not on file   Social History Narrative    Not on file     Social Determinants of Health     Financial Resource Strain: Low Risk     Difficulty of Paying Living Expenses: Not hard at all   Food Insecurity: No Food Insecurity    Worried About Running Out of Food in the Last Year: Never true    Ran Out of Food in the Last Year: Never true   Transportation Needs:     Lack of Transportation (Medical): Not on file    Lack of Transportation (Non-Medical): Not on file   Physical Activity:     Days of Exercise per Week: Not on file    Minutes of Exercise per Session: Not on file   Stress:     Feeling of Stress : Not on file   Social Connections:     Frequency of Communication with Friends and Family: Not on file    Frequency of Social Gatherings with Friends and Family: Not on file    Attends Jehovah's witness Services: Not on file    Active Member of [address] [place] [address] [employer] or Organizations: Not on file    Attends Club or Organization Meetings: Not on file    Marital Status: Not on file   Intimate Partner Violence:     Fear of Current or Ex-Partner: Not on file    Emotionally Abused: Not on file    Physically Abused: Not on file    Sexually Abused: Not on file   Housing Stability:     Unable to Pay for Housing in the Last Year: Not on file    Number of [place] in the Last Year: Not on file    Unstable Housing in the Last Year: Not on file       Review of Systems:  A comprehensive review of systems was negative except for what was noted in the HPI. Physical Exam:   Vitals:    12/09/21 0949   BP: 110/60   Pulse: 69   Temp: 97.2 °F (36.2 °C)   SpO2: 99%   Weight: 139 lb (63 kg)     Body mass index is 22.44 kg/m². Constitutional: She is oriented to person, place, and time. She appears well-developed and well-nourished. No distress. HEENT:   Head: Normocephalic and atraumatic. Right Ear: Tympanic membrane, external ear and ear canal normal.   Left Ear: Tympanic membrane, external ear and ear canal normal.   Mouth/Throat: Oropharynx is clear and moist, and mucous membranes are normal.  There is no cervical adenopathy.   Eyes: Conjunctivae and extraocular motions are normal. Pupils are equal, round, and reactive to light. Neck: Supple. No JVD present. Carotid bruit is not present. No mass and no thyromegaly present. Cardiovascular: Normal rate, regular rhythm, normal heart sounds and intact distal pulses. Exam reveals no gallop and no friction rub. No murmur heard. Pulmonary/Chest: Effort normal and breath sounds normal. No respiratory distress. She has no wheezes, rhonchi or rales. Abdominal: Soft, non-tender. Bowel sounds and aorta are normal. She exhibits no organomegaly, mass or bruit. Genitourinary: normal external genitalia, vulva, vagina, cervix, uterus and adnexa and bimanual exam limited by: pain due to narrow introitus, exam performed with one finger. Breast exam:  breasts appear normal, no suspicious masses, no skin or nipple changes or axillary nodes. Musculoskeletal: Normal range of motion, no synovitis. She exhibits no edema. Neurological: She is alert and oriented to person, place, and time. She has normal reflexes. No cranial nerve deficit. Coordination normal.   Skin: Skin is warm and dry. There is no rash or erythema. No suspicious lesions noted. Psychiatric: She has a normal mood and affect.  Her speech is normal and behavior is normal. Judgment, cognition and memory are normal.     Preventive Care:  Health Maintenance   Topic Date Due    Hepatitis C screen  Never done    COVID-19 Vaccine (1) Never done    HIV screen  Never done    Cervical cancer screen  Never done    Colon cancer screen colonoscopy  Never done    Shingles Vaccine (1 of 2) Never done    DTaP/Tdap/Td vaccine (3 - Tdap) 08/13/2012    Breast cancer screen  02/20/2016    Lipid screen  08/19/2021    Flu vaccine (1) Never done    Hepatitis A vaccine  Aged Out    Hepatitis B vaccine  Aged Out    Hib vaccine  Aged Out    Meningococcal (ACWY) vaccine  Aged Out    Pneumococcal 0-64 years Vaccine  Aged Out      Hx abnormal PAP: no   Self-breast exams: no  Last eye exam: within the past year,normal - Dr. Patti Saldana at [facility]  Exercise: tennis a few times a week, pickleball, walks 3-4 miles on Sundays  Seatbelt use: yes       [facility]        12/9/2021           Preventive Measures Status       Recommendations for screening   Colon Cancer Screen   Last colonoscopy: none yet Declines colonoscopy, but agreed to do Cologuard   Breast Cancer Screen  Last mammogram: 2014  Test recommended and ordered   Cervical Cancer Screen   Last PAP smear: years ago Test recommended and ordered   Osteoporosis Screen   Last DXA scan: 1/2014- normal Test recommended and ordered   Diabetes Screen  Glucose (mg/dL)   Date Value   07/23/2016 104 (H)    Test recommended and ordered   Cholesterol Screen  Lab Results   Component Value Date    CHOL 239 (H) 08/19/2016    TRIG 79 08/19/2016    HDL 77 (H) 08/19/2016    LDLCALC 146 (H) 08/19/2016    Test recommended and ordered   Aspirin for Cardiovascular Prevention   No Not indicated   Weight: Body mass index is 22.44 kg/m². 139 lb (63 kg)    Your BMI is between 18.5 and 24.9, which indicates that you are at a healthy weight  Continue healthy lifestyle changes (diet/exercise/maintain healthy weight).     Living Will: not addressed   Will address at future visit    Recommended Immunizations    Immunization History   Administered Date(s) Administered    DTaP/Hep B/IPV (Pediarix) 08/01/2002    Hepatitis A/Hepatitis B (Twinrix) 08/01/2002    Td, unspecified formulation 08/13/2002    Tetanus 11/05/2010    Yellow Fever (YF-Vax) 04/02/2003        Influenza vaccine:  recommended yearly, but declined  Pneumonia vaccine: due at age 72  Tetanus vaccine:  tetanus diptheria and pertussis vaccine (Tdap) due now, but will be administered after the holidays- pt considering getting at pharmacy  Shingles vaccine:  recommended, but declined  COVID vaccine: recommended but declined         Other Recommendations ·   Follow up in this office every 12 months for re-evaluation of your chronic medical issues  · See an eye specialist every 1-2 years  · See a dentist every 6 months  · Try to get at least 30 minutes of exercise 3-4 days per week  · Always wear a seat belt when traveling in a car  · Always wear a helmet when riding a bicycle or motorcycle  · When exposed to the sun, use a sunscreen that protects against both UVA and UVB radiation with an SPF of 30 or greater- reapply every 2 to 3 hours or after sweating, drying off with a towel, or swimming  · You need 8505-2531 mg of calcium and 6183-8841 IU of vitamin D per day- it is possible to meet your calcium requirement with diet alone, but a vitamin D supplement is usually necessary  · Have your blood pressure checked at least once every year                 Assessment/Plan:    Aristides Jorge was seen today for gynecologic exam.    Diagnoses and all orders for this visit:    Routine general medical examination at a health care facility  -     DEXA BONE DENSITY AXIAL SKELETON; Future  -     FIDENCIO JULITA DIGITAL SCREEN BILATERAL; Future  -     PAP SMEAR  -     CBC Auto Differential; Future  -     Comprehensive Metabolic Panel; Future  -     Lipid Panel; Future  -     COLOGUARD (FECAL DNA COLORECTAL CANCER SCREENING)    Asymptomatic postmenopausal status  -     DEXA BONE DENSITY AXIAL SKELETON; Future    Screening mammogram for breast cancer  -     FIDENCIO JULITA DIGITAL SCREEN BILATERAL; Future    Cervical smear, as part of routine gynecological examination  -     PAP SMEAR    Screening for cardiovascular condition  -     Comprehensive Metabolic Panel; Future  -     Lipid Panel; Future    Screening for deficiency anemia  -     CBC Auto Differential; Future    Screen for colon cancer  -     COLOGUARD (FECAL DNA COLORECTAL CANCER SCREENING)      Patient Instructions   Get fasting labs drawn soon.   Schedule mammogram and bone density test - [address] (for DEXA, mammogram, etc.) - [phone]  Do the Cologuard kit for colon cancer screening when it arrives at your home. Continue healthy lifestyle changes (diet/exercise/maintain healthy weight). Patient education materials given in AVS re: age appropriate well care.         Return in about 1 year (around 12/9/2022) for Physical.

## 2021-12-09 NOTE — PATIENT INSTRUCTIONS
Get fasting labs drawn soon. Schedule mammogram and bone density test - [address] (for DEXA, mammogram, etc.) - [phone]  Do the Cologuard kit for colon cancer screening when it arrives at your home. Continue healthy lifestyle changes (diet/exercise/maintain healthy weight). Return in about 1 year (around 12/9/2022) for Physical.    Patient Education        Well Visit, Women 48 to 72: Care Instructions  Overview     Well visits can help you stay healthy. Your doctor has checked your overall health and may have suggested ways to take good care of yourself. Your doctor also may have recommended tests. At home, you can help prevent illness with healthy eating, regular exercise, and other steps. Follow-up care is a key part of your treatment and safety. Be sure to make and go to all appointments, and call your doctor if you are having problems. It's also a good idea to know your test results and keep a list of the medicines you take. How can you care for yourself at home? · Get screening tests that you and your doctor decide on. Screening helps find diseases before any symptoms appear. · Eat healthy foods. Choose fruits, vegetables, whole grains, protein, and low-fat dairy foods. Limit fat, especially saturated fat. Reduce salt in your diet. · Limit alcohol. Have no more than 1 drink a day or 7 drinks a week. · Get at least 30 minutes of exercise on most days of the week. Walking is a good choice. You also may want to do other activities, such as running, swimming, cycling, or playing tennis or team sports. · Reach and stay at a healthy weight. This will lower your risk for many problems, such as obesity, diabetes, heart disease, and high blood pressure. · Do not smoke. Smoking can make health problems worse. If you need help quitting, talk to your doctor about stop-smoking programs and medicines. These can increase your chances of quitting for good. · Care for your mental health.  It is easy to get weighed down by worry and stress. Learn strategies to manage stress, like deep breathing and mindfulness, and stay connected with your family and community. If you find you often feel sad or hopeless, talk with your doctor. Treatment can help. · Talk to your doctor about whether you have any risk factors for sexually transmitted infections (STIs). You can help prevent STIs if you wait to have sex with a new partner (or partners) until you've each been tested for STIs. It also helps if you use condoms (male or female condoms) and if you limit your sex partners to one person who only has sex with you. Vaccines are available for some STIs. · If you think you may have a problem with alcohol or drug use, talk to your doctor. This includes prescription medicines (such as amphetamines and opioids) and illegal drugs (such as cocaine and methamphetamine). Your doctor can help you figure out what type of treatment is best for you. · Protect your skin from too much sun. When you're outdoors from 10 a.m. to 4 p.m., stay in the shade or cover up with clothing and a hat with a wide brim. Wear sunglasses that block UV rays. Even when it's cloudy, put broad-spectrum sunscreen (SPF 30 or higher) on any exposed skin. · See a dentist one or two times a year for checkups and to have your teeth cleaned. · Wear a seat belt in the car. When should you call for help? Watch closely for changes in your health, and be sure to contact your doctor if you have any problems or symptoms that concern you. Where can you learn more? Go to https://meena.[email or web address] org and sign in to your GOGETMi / ?????.?? account. Enter W702 in the [facility] box to learn more about \"Well Visit, Women 50 to 72: Care Instructions. \"     If you do not have an account, please click on the \"Sign Up Now\" link. Current as of: February 11, 2021               Content Version: 13.0  © 9883-9038 Healthwise, Incorporated.    Care instructions adapted under license by [facility] ([facility]). If you have questions about a medical condition or this instruction, always ask your healthcare professional. [address] any warranty or liability for your use of this information.

## 2021-12-10 ENCOUNTER — HOSPITAL ENCOUNTER (OUTPATIENT)
Age: 61
Discharge: HOME | End: 2021-12-10
Payer: COMMERCIAL

## 2021-12-10 DIAGNOSIS — Z13.6 SCREENING FOR CARDIOVASCULAR CONDITION: ICD-10-CM

## 2021-12-10 DIAGNOSIS — Z13.0 SCREENING FOR DEFICIENCY ANEMIA: ICD-10-CM

## 2021-12-10 DIAGNOSIS — Z00.00 ROUTINE GENERAL MEDICAL EXAMINATION AT A HEALTH CARE FACILITY: ICD-10-CM

## 2021-12-10 LAB
A/G RATIO: 2.5 (ref 1.1–2.2)
ALBUMIN SERPL-MCNC: 4.9 G/DL (ref 3.4–5)
ALP BLD-CCNC: 82 U/L (ref 40–129)
ALT SERPL-CCNC: 19 U/L (ref 10–40)
ANION GAP SERPL CALCULATED.3IONS-SCNC: 14 MMOL/L (ref 3–16)
AST SERPL-CCNC: 28 U/L (ref 15–37)
BASOPHILS ABSOLUTE: 0 K/UL (ref 0–0.2)
BASOPHILS RELATIVE PERCENT: 0.8 %
BILIRUB SERPL-MCNC: 0.6 MG/DL (ref 0–1)
BUN BLDV-MCNC: 9 MG/DL (ref 7–20)
CALCIUM SERPL-MCNC: 9.7 MG/DL (ref 8.3–10.6)
CHLORIDE BLD-SCNC: 100 MMOL/L (ref 99–110)
CHOLESTEROL, TOTAL: 275 MG/DL (ref 0–199)
CO2: 24 MMOL/L (ref 21–32)
CREAT SERPL-MCNC: 0.6 MG/DL (ref 0.6–1.2)
EOSINOPHILS ABSOLUTE: 0.1 K/UL (ref 0–0.6)
EOSINOPHILS RELATIVE PERCENT: 1.5 %
GFR AFRICAN AMERICAN: >60
GFR NON-AFRICAN AMERICAN: >60
GLUCOSE BLD-MCNC: 81 MG/DL (ref 70–99)
HCT VFR BLD CALC: 43.9 % (ref 36–48)
HDLC SERPL-MCNC: 101 MG/DL (ref 40–60)
HEMOGLOBIN: 14.4 G/DL (ref 12–16)
LDL CHOLESTEROL CALCULATED: 165 MG/DL
LYMPHOCYTES ABSOLUTE: 2 K/UL (ref 1–5.1)
LYMPHOCYTES RELATIVE PERCENT: 48.4 %
MCH RBC QN AUTO: 29.2 PG (ref 26–34)
MCHC RBC AUTO-ENTMCNC: 32.8 G/DL (ref 31–36)
MCV RBC AUTO: 89.1 FL (ref 80–100)
MONOCYTES ABSOLUTE: 0.4 K/UL (ref 0–1.3)
MONOCYTES RELATIVE PERCENT: 9.2 %
NEUTROPHILS ABSOLUTE: 1.6 K/UL (ref 1.7–7.7)
NEUTROPHILS RELATIVE PERCENT: 40.1 %
PDW BLD-RTO: 13.9 % (ref 12.4–15.4)
PLATELET # BLD: 201 K/UL (ref 135–450)
PMV BLD AUTO: 8.8 FL (ref 5–10.5)
POTASSIUM SERPL-SCNC: 5 MMOL/L (ref 3.5–5.1)
RBC # BLD: 4.92 M/UL (ref 4–5.2)
SODIUM BLD-SCNC: 138 MMOL/L (ref 136–145)
TOTAL PROTEIN: 6.9 G/DL (ref 6.4–8.2)
TRIGL SERPL-MCNC: 47 MG/DL (ref 0–150)
VLDLC SERPL CALC-MCNC: 9 MG/DL
WBC # BLD: 4.1 K/UL (ref 4–11)

## 2021-12-10 PROCEDURE — 85025 COMPLETE CBC W/AUTO DIFF WBC: CPT

## 2021-12-10 PROCEDURE — 80053 COMPREHEN METABOLIC PANEL: CPT

## 2021-12-10 PROCEDURE — 80061 LIPID PANEL: CPT

## 2021-12-10 PROCEDURE — 36415 COLL VENOUS BLD VENIPUNCTURE: CPT

## 2021-12-11 LAB
HPV COMMENT: NORMAL
HPV TYPE 16: NOT DETECTED
HPV TYPE 18: NOT DETECTED
HPVOH (OTHER TYPES): NOT DETECTED

## 2021-12-22 ENCOUNTER — HOSPITAL ENCOUNTER (OUTPATIENT)
Dept: WOMENS IMAGING | Age: 61
Discharge: HOME | End: 2021-12-22
Payer: COMMERCIAL

## 2021-12-22 VITALS — WEIGHT: 136 LBS | HEIGHT: 66 IN | BODY MASS INDEX: 21.86 KG/M2

## 2021-12-22 DIAGNOSIS — Z78.0 ASYMPTOMATIC POSTMENOPAUSAL STATUS: ICD-10-CM

## 2021-12-22 DIAGNOSIS — Z12.31 SCREENING MAMMOGRAM FOR BREAST CANCER: ICD-10-CM

## 2021-12-22 DIAGNOSIS — Z00.00 ROUTINE GENERAL MEDICAL EXAMINATION AT A HEALTH CARE FACILITY: ICD-10-CM

## 2021-12-22 PROCEDURE — 77063 BREAST TOMOSYNTHESIS BI: CPT

## 2021-12-22 PROCEDURE — 77080 DXA BONE DENSITY AXIAL: CPT

## 2022-05-31 ENCOUNTER — OFFICE VISIT (OUTPATIENT)
Dept: INTERNAL MEDICINE CLINIC | Age: 62
End: 2022-05-31
Payer: COMMERCIAL

## 2022-05-31 VITALS
SYSTOLIC BLOOD PRESSURE: 110 MMHG | DIASTOLIC BLOOD PRESSURE: 50 MMHG | BODY MASS INDEX: 22.11 KG/M2 | WEIGHT: 137 LBS | TEMPERATURE: 98.2 F

## 2022-05-31 DIAGNOSIS — H61.21 IMPACTED CERUMEN OF RIGHT EAR: ICD-10-CM

## 2022-05-31 DIAGNOSIS — H92.02 LEFT EAR PAIN: ICD-10-CM

## 2022-05-31 DIAGNOSIS — J02.9 PHARYNGITIS, UNSPECIFIED ETIOLOGY: Primary | ICD-10-CM

## 2022-05-31 PROCEDURE — G8427 DOCREV CUR MEDS BY ELIG CLIN: HCPCS | Performed by: NURSE PRACTITIONER

## 2022-05-31 PROCEDURE — 3017F COLORECTAL CA SCREEN DOC REV: CPT | Performed by: NURSE PRACTITIONER

## 2022-05-31 PROCEDURE — G8420 CALC BMI NORM PARAMETERS: HCPCS | Performed by: NURSE PRACTITIONER

## 2022-05-31 PROCEDURE — 1036F TOBACCO NON-USER: CPT | Performed by: NURSE PRACTITIONER

## 2022-05-31 PROCEDURE — 99212 OFFICE O/P EST SF 10 MIN: CPT | Performed by: NURSE PRACTITIONER

## 2022-05-31 ASSESSMENT — ENCOUNTER SYMPTOMS
DIARRHEA: 0
WHEEZING: 0
NAUSEA: 0
SHORTNESS OF BREATH: 0
BLOOD IN STOOL: 0
CONSTIPATION: 0
VOMITING: 0
EYE DISCHARGE: 0
ABDOMINAL PAIN: 0
COUGH: 0

## 2022-05-31 ASSESSMENT — PATIENT HEALTH QUESTIONNAIRE - PHQ9
SUM OF ALL RESPONSES TO PHQ QUESTIONS 1-9: 0
2. FEELING DOWN, DEPRESSED OR HOPELESS: 0
1. LITTLE INTEREST OR PLEASURE IN DOING THINGS: 0
SUM OF ALL RESPONSES TO PHQ9 QUESTIONS 1 & 2: 0
SUM OF ALL RESPONSES TO PHQ QUESTIONS 1-9: 0

## 2022-05-31 NOTE — PROGRESS NOTES
22    [facility]  64 [facility].  female      Chief Complaint   Patient presents with    Pharyngitis     left side         HPI:   Pt presents with complaint of sore throat on left side only with some mild ear pain on the left. Onset was 3 days ago and is getting better every day. Her concern is that she is leaving later this week to visit her parents in [place] and didn't want to be the carrier of anything contagious to them. Denies any fever, cough or congestion. 1. Pharyngitis, unspecified etiology  SWG  Ibuprofen for comfort  Call with any progression or persistence of sx    2. Left ear pain  As above    3. Impacted cerumen of right ear  Use softening drops a couple of nights to assist ear in cleaning itself  Return to office as needed      BP (!) 110/50   Temp 98.2 °F (36.8 °C)   Wt 137 lb (62.1 kg)   BMI 22.11 kg/m²     No current outpatient medications on file. No current facility-administered medications for this visit. Social History     Socioeconomic History    Marital status:      Spouse name: Not on file    Number of children: Not on file    Years of education: Not on file    Highest education level: Not on file   Occupational History    Not on file   Tobacco Use    Smoking status: Former Smoker     Types: Cigarettes     Quit date: 2013     Years since quittin.4    Smokeless tobacco: Never Used   Substance and Sexual Activity    Alcohol use: Yes    Drug use: No    Sexual activity: Not on file   Other Topics Concern    Not on file   Social History Narrative    Not on file     Social Determinants of Health     Financial Resource Strain: Low Risk     Difficulty of Paying Living Expenses: Not hard at all   Food Insecurity: No Food Insecurity    Worried About Running Out of Food in the Last Year: Never true    [address] in the Last Year: Never true   Transportation Needs:     Lack of Transportation (Medical):  Not on file    Lack of Transportation (Non-Medical): Not on file   Physical Activity:     Days of Exercise per Week: Not on file    Minutes of Exercise per Session: Not on file   Stress:     Feeling of Stress : Not on file   Social Connections:     Frequency of Communication with Friends and Family: Not on file    Frequency of Social Gatherings with Friends and Family: Not on file    Attends Jehovah's witness Services: Not on file    Active Member of [address] [place] [address] or Organizations: Not on file    Attends Club or Organization Meetings: Not on file    Marital Status: Not on file   Intimate Partner Violence:     Fear of Current or Ex-Partner: Not on file    Emotionally Abused: Not on file    Physically Abused: Not on file    Sexually Abused: Not on file   Housing Stability:     Unable to Pay for Housing in the Last Year: Not on file    Number of [place] in the Last Year: Not on file    Unstable Housing in the Last Year: Not on file       Family History   Problem Relation Age of Onset    Cancer Mother        Past Medical History:   Diagnosis Date    H/O Graves' disease        Review of Systems   Constitutional: Negative for fever. HENT: Negative for congestion. Eyes: Negative for discharge. Respiratory: Negative for cough, shortness of breath and wheezing. Cardiovascular: Negative for chest pain, palpitations and leg swelling. Gastrointestinal: Negative for abdominal pain, blood in stool, constipation, diarrhea, nausea and vomiting. Genitourinary: Negative for dysuria and hematuria. Musculoskeletal: Negative for myalgias. Skin: Negative for rash. Neurological: Negative for dizziness. Psychiatric/Behavioral: The patient is not nervous/anxious. Physical Exam  Constitutional:       General: She is not in acute distress. Appearance: She is not diaphoretic. HENT:      Right Ear: External ear normal.      Left Ear: External ear normal.      Ears:      Comments: Small canals with R 100%occluded with cerumen.   Small amount of cerumen in L; TM without S/S of infection. Mouth/Throat:      Pharynx: No oropharyngeal exudate. Comments: Pt with large tonsils and mild increase of erythema bilaterally. Eyes:      General: No scleral icterus. Right eye: No discharge. Left eye: No discharge. Neck:      Thyroid: No thyromegaly. Cardiovascular:      Rate and Rhythm: Normal rate and regular rhythm. Heart sounds: Normal heart sounds. No murmur heard. No friction rub. No gallop. Pulmonary:      Effort: Pulmonary effort is normal.      Breath sounds: Normal breath sounds. No wheezing. Abdominal:      General: Bowel sounds are normal. There is no distension. Palpations: Abdomen is soft. Tenderness: There is no abdominal tenderness. Musculoskeletal:         General: No tenderness. Normal range of motion. Cervical back: Normal range of motion. Lymphadenopathy:      Cervical: No cervical adenopathy. Skin:     General: Skin is warm and dry. Findings: No erythema or rash. Neurological:      Mental Status: She is alert and oriented to person, place, and time.    Psychiatric:         Judgment: Judgment normal.                Casi Lopes, KEKE - CNP

## 2023-02-09 ENCOUNTER — OFFICE VISIT (OUTPATIENT)
Dept: INTERNAL MEDICINE CLINIC | Age: 63
End: 2023-02-09
Payer: COMMERCIAL

## 2023-02-09 VITALS
BODY MASS INDEX: 22.95 KG/M2 | TEMPERATURE: 97.8 F | HEART RATE: 60 BPM | WEIGHT: 142.2 LBS | SYSTOLIC BLOOD PRESSURE: 120 MMHG | OXYGEN SATURATION: 98 % | DIASTOLIC BLOOD PRESSURE: 70 MMHG

## 2023-02-09 DIAGNOSIS — Z13.0 SCREENING FOR DEFICIENCY ANEMIA: ICD-10-CM

## 2023-02-09 DIAGNOSIS — Z12.31 SCREENING MAMMOGRAM FOR BREAST CANCER: ICD-10-CM

## 2023-02-09 DIAGNOSIS — Z00.00 ROUTINE GENERAL MEDICAL EXAMINATION AT A HEALTH CARE FACILITY: Primary | ICD-10-CM

## 2023-02-09 DIAGNOSIS — Z13.6 SCREENING FOR CARDIOVASCULAR CONDITION: ICD-10-CM

## 2023-02-09 PROBLEM — H60.43 KERATOSIS OBTURANS OF EXTERNAL EAR CANAL, BILATERAL: Status: ACTIVE | Noted: 2018-06-24

## 2023-02-09 PROBLEM — B36.9 CHRONIC MYCOTIC OTITIS EXTERNA: Status: ACTIVE | Noted: 2018-06-24

## 2023-02-09 PROBLEM — H90.2 CONDUCTIVE HEARING LOSS, EXTERNAL EAR: Status: ACTIVE | Noted: 2018-06-24

## 2023-02-09 PROBLEM — H62.40 CHRONIC MYCOTIC OTITIS EXTERNA: Status: ACTIVE | Noted: 2018-06-24

## 2023-02-09 PROCEDURE — G8484 FLU IMMUNIZE NO ADMIN: HCPCS | Performed by: FAMILY MEDICINE

## 2023-02-09 PROCEDURE — 99396 PREV VISIT EST AGE 40-64: CPT | Performed by: FAMILY MEDICINE

## 2023-02-09 SDOH — ECONOMIC STABILITY: FOOD INSECURITY: WITHIN THE PAST 12 MONTHS, THE FOOD YOU BOUGHT JUST DIDN'T LAST AND YOU DIDN'T HAVE MONEY TO GET MORE.: NEVER TRUE

## 2023-02-09 SDOH — ECONOMIC STABILITY: FOOD INSECURITY: WITHIN THE PAST 12 MONTHS, YOU WORRIED THAT YOUR FOOD WOULD RUN OUT BEFORE YOU GOT MONEY TO BUY MORE.: NEVER TRUE

## 2023-02-09 SDOH — ECONOMIC STABILITY: INCOME INSECURITY: HOW HARD IS IT FOR YOU TO PAY FOR THE VERY BASICS LIKE FOOD, HOUSING, MEDICAL CARE, AND HEATING?: NOT HARD AT ALL

## 2023-02-09 SDOH — ECONOMIC STABILITY: HOUSING INSECURITY
IN THE LAST 12 MONTHS, WAS THERE A TIME WHEN YOU DID NOT HAVE A STEADY PLACE TO SLEEP OR SLEPT IN A SHELTER (INCLUDING NOW)?: NO

## 2023-02-09 ASSESSMENT — PATIENT HEALTH QUESTIONNAIRE - PHQ9
2. FEELING DOWN, DEPRESSED OR HOPELESS: 0
SUM OF ALL RESPONSES TO PHQ QUESTIONS 1-9: 0
1. LITTLE INTEREST OR PLEASURE IN DOING THINGS: 0
SUM OF ALL RESPONSES TO PHQ QUESTIONS 1-9: 0
SUM OF ALL RESPONSES TO PHQ9 QUESTIONS 1 & 2: 0

## 2023-02-09 NOTE — PROGRESS NOTES
History and Physical      [facility]  [facility] 1960    Date of Service:  2/9/2023    Chief Complaint:   Bridgette Venegas is a 58 y.o. female who presents for complete physical examination. HPI: Doing well. No complaints. Wt Readings from Last 3 Encounters:   02/09/23 142 lb 3.2 oz (64.5 kg)   05/31/22 137 lb (62.1 kg)   12/22/21 136 lb (61.7 kg)     BP Readings from Last 3 Encounters:   02/09/23 120/70   05/31/22 (!) 110/50   12/09/21 110/60       Patient Active Problem List   Diagnosis    H/O Graves' disease    Biceps tendinitis of right upper extremity    Weakness of right hand    Posterior interosseous nerve injury, right, sequela    Mallet finger of left finger(s)    Closed displaced fracture of distal phalanx of left ring finger    Chronic mycotic otitis externa    Conductive hearing loss, external ear    Keratosis obturans of external ear canal, bilateral       No Known Allergies  No outpatient medications have been marked as taking for the 2/9/23 encounter (Office Visit) with Rob Zavaleta MD.       Past Medical History:   Diagnosis Date    H/O Graves' disease      History reviewed. No pertinent surgical history.   Family History   Problem Relation Age of Onset    Cancer Mother      Social History     Socioeconomic History    Marital status:      Spouse name: Not on file    Number of children: Not on file    Years of education: Not on file    Highest education level: Not on file   Occupational History    Not on file   Tobacco Use    Smoking status: Former     Types: Cigarettes     Quit date: 1/1/2013     Years since quitting: 10.1    Smokeless tobacco: Never   Substance and Sexual Activity    Alcohol use: Yes    Drug use: No    Sexual activity: Not on file   Other Topics Concern    Not on file   Social History Narrative    Not on file     Social Determinants of Health     Financial Resource Strain: Low Risk     Difficulty of Paying Living Expenses: Not hard at all   Food Insecurity: No Food Insecurity    Worried About Running Out of Food in the Last Year: Never true    Ran Out of Food in the Last Year: Never true   Transportation Needs: Unknown    Lack of Transportation (Medical): Not on file    Lack of Transportation (Non-Medical): No   Physical Activity: Not on file   Stress: Not on file   Social Connections: Not on file   Intimate Partner Violence: Not on file   Housing Stability: Unknown    Unable to Pay for Housing in the Last Year: Not on file    Number of Places Lived in the Last Year: Not on file    Unstable Housing in the Last Year: No       Review of Systems:  A comprehensive review of systems was negative except for what was noted in the HPI. Physical Exam:   Vitals:    02/09/23 1009   BP: 120/70   Site: Right Upper Arm   Position: Sitting   Cuff Size: Medium Adult   Pulse: 60   Temp: 97.8 °F (36.6 °C)   TempSrc: Temporal   SpO2: 98%   Weight: 142 lb 3.2 oz (64.5 kg)     Body mass index is 22.95 kg/m². Constitutional: She is oriented to person, place, and time. She appears well-developed and well-nourished. No distress. HEENT:   Head: Normocephalic and atraumatic. Right Ear: Tympanic membrane, external ear and ear canal normal.   Left Ear: Tympanic membrane, external ear and ear canal normal.   Mouth/Throat: Oropharynx is clear and moist, and mucous membranes are normal.  There is no cervical adenopathy. Eyes: Conjunctivae and extraocular motions are normal. Pupils are equal, round, and reactive to light. Neck: Supple. No JVD present. Carotid bruit is not present. No mass and no thyromegaly present. Cardiovascular: Normal rate, regular rhythm, normal heart sounds and intact distal pulses. Exam reveals no gallop and no friction rub. No murmur heard. Pulmonary/Chest: Effort normal and breath sounds normal. No respiratory distress. She has no wheezes, rhonchi or rales. Abdominal: Soft, non-tender.  Bowel sounds and aorta are normal. She exhibits no organomegaly, mass or bruit. Genitourinary: not examined. Breast exam:  not examined. Musculoskeletal: Normal range of motion, no synovitis. She exhibits no edema. Neurological: She is alert and oriented to person, place, and time. She has normal reflexes. No cranial nerve deficit. Coordination normal.   Skin: Skin is warm and dry. There is no rash or erythema. No suspicious lesions noted. Psychiatric: She has a normal mood and affect.  Her speech is normal and behavior is normal. Judgment, cognition and memory are normal.     Preventive Care:  Health Maintenance   Topic Date Due    COVID-19 Vaccine (1) Never done    HIV screen  Never done    Hepatitis C screen  Never done    Shingles vaccine (1 of 2) Never done    DTaP/Tdap/Td vaccine (3 - Tdap) 08/13/2012    Flu vaccine (1) Never done    Depression Screen  05/31/2023    Breast cancer screen  12/22/2023    Colorectal Cancer Screen  12/17/2024    Cervical cancer screen  12/09/2026    Lipids  12/10/2026    Hepatitis A vaccine  Aged Out    Hib vaccine  Aged Out    Meningococcal (ACWY) vaccine  Aged Out    Pneumococcal 0-64 years Vaccine  Aged Out      Hx abnormal PAP: no  Self-breast exams: yes  Last eye exam: January 2023, normal   Exercise: plays rac[place]-Tetratball, walks 4-5 miles a few times a week, does weights  Seatbelt use: yes       Preventive plan of care for Barri Atzel        2/9/2023           Preventive Measures Status       Recommendations for screening   Colon Cancer Screen   Last colonoscopy: Cologuard 12/2021 Repeat Cologuard every 3 years   Breast Cancer Screen  Last mammogram: 12/2021  Test recommended and ordered   Cervical Cancer Screen   Last PAP smear: 12/2021 Test is due 12/2024   Osteoporosis Screen   Last DXA scan: 12/2021 Repeat every 3 years   Diabetes Screen  Glucose (mg/dL)   Date Value   12/10/2021 81    Test recommended and ordered   Cholesterol Screen  Lab Results   Component Value Date    CHOL 275 (H) 12/10/2021    TRIG 47 12/10/2021  (H) 12/10/2021    LDLCALC 165 (H) 12/10/2021    Test recommended and ordered   Aspirin for Cardiovascular Prevention   No Not indicated   Weight: Body mass index is 22.95 kg/m². 142 lb 3.2 oz (64.5 kg)    Your BMI is between 18.5 and 24.9, which indicates that you are at a healthy weight  Continue healthy lifestyle changes (diet/exercise/maintain healthy weight).      Living Will: Yes   Copy requested    Recommended Immunizations    Immunization History   Administered Date(s) Administered    DTaP/Hep B/IPV (Pediarix) 08/01/2002    Hepatitis A/Hepatitis B (Twinrix) 08/01/2002    Td, unspecified formulation 08/13/2002    Tetanus 11/05/2010    Yellow Fever (YF-Vax) 04/02/2003        Influenza vaccine:  recommended yearly, but declined  Pneumonia vaccine: due at age 72  Tetanus vaccine:  tetanus, diptheria and pertussis vaccine (Tdap) previously administered after age 23- no need to repeat, tetanus and diptheria vaccine (Td) recommended every 10 years- due now, but pt wishes to postpone at this time  Shingles vaccine:  recommended, but declined, consider getting in the future  COVID vaccine: recommended but declined         Other Recommendations   Follow up in this office every 12 months for re-evaluation of your chronic medical issues  See an eye specialist every 1-2 years  See a dentist every 6 months  Try to get at least 30 minutes of exercise 3-4 days per week  Always wear a seat belt when traveling in a car  Always wear a helmet when riding a bicycle or motorcycle  When exposed to the sun, use a sunscreen that protects against both UVA and UVB radiation with an SPF of 30 or greater- reapply every 2 to 3 hours or after sweating, drying off with a towel, or swimming  You need 0795-0451 mg of calcium and [id number] IU of vitamin D per day- it is possible to meet your calcium requirement with diet alone, but a vitamin D supplement is usually necessary  Have your blood pressure checked at least once every year                 Assessment/Plan:    Macarena Wright was seen today for annual exam.    Diagnoses and all orders for this visit:    Routine general medical examination at a health care facility  -     [facility] JULITA DIGITAL SCREEN BILATERAL; Future  -     CBC with Auto Differential; Future  -     Comprehensive Metabolic Panel; Future  -     Lipid Panel; Future    Screening for cardiovascular condition  -     Comprehensive Metabolic Panel; Future  -     Lipid Panel; Future    Screening for deficiency anemia  -     CBC with Auto Differential; Future    Screening mammogram for breast cancer  -     [facility] JULITA DIGITAL SCREEN BILATERAL; Future      Patient Instructions   Get fasting labs drawn soon. Continue healthy lifestyle changes (diet/exercise/maintain healthy weight). Continue current supplements. Schedule gynecology exam in 12/2024. You will be due for repeat colon cancer screening and repeat bone density test in 12/2024. Schedule mammogram soon- [address] (for DEXA, mammogram, etc.) - [phone]  Consider updating your tetanus vaccine soon. Return in about 1 year (around 2/9/2024) for Fasting Physical with new PCP (sooner if labs are abnormal).

## 2023-02-09 NOTE — PATIENT INSTRUCTIONS
Get fasting labs drawn soon. Continue healthy lifestyle changes (diet/exercise/maintain healthy weight). Continue current supplements. Schedule gynecology exam in 12/2024. You will be due for repeat colon cancer screening and repeat bone density test in 12/2024. Schedule mammogram soon- [address] (for DEXA, mammogram, etc.) - [phone]  Consider updating your tetanus vaccine soon. Return in about 1 year (around 2/9/2024) for Fasting Physical with new PCP (sooner if labs are abnormal).

## 2023-02-16 ENCOUNTER — HOSPITAL ENCOUNTER (OUTPATIENT)
Age: 63
Setting detail: SPECIMEN
Discharge: HOME | End: 2023-02-16
Payer: COMMERCIAL

## 2023-02-16 DIAGNOSIS — Z13.0 SCREENING FOR DEFICIENCY ANEMIA: ICD-10-CM

## 2023-02-16 DIAGNOSIS — Z13.6 SCREENING FOR CARDIOVASCULAR CONDITION: ICD-10-CM

## 2023-02-16 DIAGNOSIS — Z00.00 ROUTINE GENERAL MEDICAL EXAMINATION AT A HEALTH CARE FACILITY: ICD-10-CM

## 2023-02-16 LAB
A/G RATIO: 1.9 (ref 1.1–2.2)
ALBUMIN SERPL-MCNC: 4.4 G/DL (ref 3.4–5)
ALP BLD-CCNC: 76 U/L (ref 40–129)
ALT SERPL-CCNC: 17 U/L (ref 10–40)
ANION GAP SERPL CALCULATED.3IONS-SCNC: 9 MMOL/L (ref 3–16)
AST SERPL-CCNC: 22 U/L (ref 15–37)
BASOPHILS ABSOLUTE: 0 K/UL (ref 0–0.2)
BASOPHILS RELATIVE PERCENT: 0.6 %
BILIRUB SERPL-MCNC: 0.5 MG/DL (ref 0–1)
BUN BLDV-MCNC: 11 MG/DL (ref 7–20)
CALCIUM SERPL-MCNC: 9.7 MG/DL (ref 8.3–10.6)
CHLORIDE BLD-SCNC: 100 MMOL/L (ref 99–110)
CHOLESTEROL, TOTAL: 298 MG/DL (ref 0–199)
CO2: 29 MMOL/L (ref 21–32)
CREAT SERPL-MCNC: 0.6 MG/DL (ref 0.6–1.2)
EOSINOPHILS ABSOLUTE: 0.1 K/UL (ref 0–0.6)
EOSINOPHILS RELATIVE PERCENT: 2 %
GFR SERPL CREATININE-BSD FRML MDRD: >60 ML/MIN/{1.73_M2}
GLUCOSE BLD-MCNC: 90 MG/DL (ref 70–99)
HCT VFR BLD CALC: 42.8 % (ref 36–48)
HDLC SERPL-MCNC: 89 MG/DL (ref 40–60)
HEMOGLOBIN: 14.1 G/DL (ref 12–16)
LDL CHOLESTEROL CALCULATED: 195 MG/DL
LYMPHOCYTES ABSOLUTE: 1.8 K/UL (ref 1–5.1)
LYMPHOCYTES RELATIVE PERCENT: 43.3 %
MCH RBC QN AUTO: 29.5 PG (ref 26–34)
MCHC RBC AUTO-ENTMCNC: 33.1 G/DL (ref 31–36)
MCV RBC AUTO: 89.3 FL (ref 80–100)
MONOCYTES ABSOLUTE: 0.4 K/UL (ref 0–1.3)
MONOCYTES RELATIVE PERCENT: 10.3 %
NEUTROPHILS ABSOLUTE: 1.8 K/UL (ref 1.7–7.7)
NEUTROPHILS RELATIVE PERCENT: 43.8 %
PDW BLD-RTO: 13.7 % (ref 12.4–15.4)
PLATELET # BLD: 211 K/UL (ref 135–450)
PMV BLD AUTO: 8.7 FL (ref 5–10.5)
POTASSIUM SERPL-SCNC: 5.2 MMOL/L (ref 3.5–5.1)
RBC # BLD: 4.79 M/UL (ref 4–5.2)
SODIUM BLD-SCNC: 138 MMOL/L (ref 136–145)
TOTAL PROTEIN: 6.7 G/DL (ref 6.4–8.2)
TRIGL SERPL-MCNC: 68 MG/DL (ref 0–150)
VLDLC SERPL CALC-MCNC: 14 MG/DL
WBC # BLD: 4.2 K/UL (ref 4–11)

## 2023-02-16 PROCEDURE — 36415 COLL VENOUS BLD VENIPUNCTURE: CPT

## 2023-02-16 PROCEDURE — 80053 COMPREHEN METABOLIC PANEL: CPT

## 2023-02-16 PROCEDURE — 80061 LIPID PANEL: CPT

## 2023-02-16 PROCEDURE — 85025 COMPLETE CBC W/AUTO DIFF WBC: CPT
